# Patient Record
Sex: FEMALE | Race: WHITE | NOT HISPANIC OR LATINO | Employment: OTHER | URBAN - METROPOLITAN AREA
[De-identification: names, ages, dates, MRNs, and addresses within clinical notes are randomized per-mention and may not be internally consistent; named-entity substitution may affect disease eponyms.]

---

## 2019-04-15 ENCOUNTER — TELEPHONE (OUTPATIENT)
Dept: FAMILY MEDICINE | Facility: CLINIC | Age: 29
End: 2019-04-15

## 2019-04-15 NOTE — TELEPHONE ENCOUNTER
Called in patient medication called in and informed patient that she have to make appointment before her next refill. Patient schedule appointment for next Monday

## 2019-04-22 ENCOUNTER — OFFICE VISIT (OUTPATIENT)
Dept: FAMILY MEDICINE | Facility: CLINIC | Age: 29
End: 2019-04-22
Payer: MEDICAID

## 2019-04-22 VITALS
BODY MASS INDEX: 21.18 KG/M2 | SYSTOLIC BLOOD PRESSURE: 108 MMHG | TEMPERATURE: 98 F | WEIGHT: 143 LBS | HEART RATE: 90 BPM | HEIGHT: 69 IN | DIASTOLIC BLOOD PRESSURE: 80 MMHG | OXYGEN SATURATION: 99 % | RESPIRATION RATE: 18 BRPM

## 2019-04-22 DIAGNOSIS — Z79.899 ON LONG TERM DRUG THERAPY: ICD-10-CM

## 2019-04-22 DIAGNOSIS — E89.0 POSTOPERATIVE HYPOTHYROIDISM: ICD-10-CM

## 2019-04-22 DIAGNOSIS — F41.9 ANXIETY: ICD-10-CM

## 2019-04-22 DIAGNOSIS — R00.2 PALPITATION: ICD-10-CM

## 2019-04-22 DIAGNOSIS — Z78.9 ALCOHOL USE: ICD-10-CM

## 2019-04-22 DIAGNOSIS — C73 THYROID CANCER: ICD-10-CM

## 2019-04-22 PROBLEM — F10.90 ALCOHOL USE: Status: ACTIVE | Noted: 2019-04-22

## 2019-04-22 PROCEDURE — 99214 OFFICE O/P EST MOD 30 MIN: CPT | Mod: S$GLB,,, | Performed by: FAMILY MEDICINE

## 2019-04-22 PROCEDURE — 99214 PR OFFICE/OUTPT VISIT, EST, LEVL IV, 30-39 MIN: ICD-10-PCS | Mod: S$GLB,,, | Performed by: FAMILY MEDICINE

## 2019-04-22 RX ORDER — CITALOPRAM 20 MG/1
20 TABLET, FILM COATED ORAL DAILY
Qty: 30 TABLET | Refills: 11 | Status: SHIPPED | OUTPATIENT
Start: 2019-04-22 | End: 2019-06-18 | Stop reason: SDUPTHER

## 2019-04-22 RX ORDER — LEVOTHYROXINE SODIUM 88 UG/1
1 TABLET ORAL DAILY
COMMUNITY
End: 2019-04-24 | Stop reason: SDUPTHER

## 2019-04-22 RX ORDER — CITALOPRAM 20 MG/1
1 TABLET, FILM COATED ORAL DAILY
COMMUNITY
Start: 2019-04-15 | End: 2019-04-22 | Stop reason: SDUPTHER

## 2019-04-22 RX ORDER — LIOTHYRONINE SODIUM 5 UG/1
1 TABLET ORAL DAILY
COMMUNITY
End: 2019-04-24 | Stop reason: SDUPTHER

## 2019-04-22 NOTE — PROGRESS NOTES
Subjective:      Patient ID: Kimber Herndon is a 28 y.o. female.    Chief Complaint: Other (medication refill and blood work)      HPI:  28-year-old white female past medical history of focal papillary thyroid cancer and subclinical hypothyroidism and anxiety who presents with hypothyroidism.  Feels well.  Energy good.  Taking Celexa as directed.  No side effects to the meds noted. Has energy.  She is irritable today.  She attributes this to being on her cycle.  She is trying to get it tubal with her gyn    Past Medical History:   Diagnosis Date    Anxiety     Cancer     thyroid     Depression     Hypothyroidism      Past Surgical History:   Procedure Laterality Date    MOUTH SURGERY      TOTAL THYROIDECTOMY       Family History   Problem Relation Age of Onset    Arthritis Mother     Autoimmune disease Mother     Lupus Mother     Hypothyroidism Mother     Hypertension Father     Alcohol abuse Father     Hypothyroidism Father     Hypothyroidism Sister     Fibromyalgia Son     Asthma Son     Stroke Maternal Grandfather     Heart disease Maternal Grandfather     Cancer Paternal Grandmother      Social History     Socioeconomic History    Marital status:      Spouse name: Not on file    Number of children: Not on file    Years of education: Not on file    Highest education level: Not on file   Occupational History    Not on file   Social Needs    Financial resource strain: Not on file    Food insecurity:     Worry: Not on file     Inability: Not on file    Transportation needs:     Medical: Not on file     Non-medical: Not on file   Tobacco Use    Smoking status: Former Smoker     Last attempt to quit:      Years since quittin.3   Substance and Sexual Activity    Alcohol use: Not Currently     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    Drug use: Never    Sexual activity: Not on file   Lifestyle    Physical activity:     Days per week: Not on file     Minutes per session: Not  "on file    Stress: Not on file   Relationships    Social connections:     Talks on phone: Not on file     Gets together: Not on file     Attends Rastafari service: Not on file     Active member of club or organization: Not on file     Attends meetings of clubs or organizations: Not on file     Relationship status: Not on file   Other Topics Concern    Not on file   Social History Narrative    Not on file     Review of patient's allergies indicates:   Allergen Reactions    Codeine Anaphylaxis       Review of Systems   Constitutional: Negative for activity change, appetite change, chills, fatigue and fever.   HENT: Negative for congestion, ear pain, postnasal drip, rhinorrhea, sinus pressure, sinus pain and sore throat.    Eyes: Negative for pain and redness.   Respiratory: Negative for cough, chest tightness and shortness of breath.    Cardiovascular: Negative for chest pain and leg swelling.   Gastrointestinal: Negative for abdominal distention, abdominal pain, constipation, diarrhea, nausea and vomiting.   Endocrine: Negative for cold intolerance and heat intolerance.   Genitourinary: Negative for dysuria, frequency and hematuria.   Musculoskeletal: Negative for arthralgias, back pain and joint swelling.   Skin: Negative for pallor.   Neurological: Negative for dizziness and light-headedness.   Psychiatric/Behavioral: Positive for dysphoric mood. Negative for agitation, decreased concentration and hallucinations. The patient is not nervous/anxious.        Objective:       /80 (BP Location: Right arm, Patient Position: Sitting, BP Method: Medium (Manual))   Pulse 90   Temp 98.3 °F (36.8 °C) (Oral)   Resp 18   Ht 5' 9" (1.753 m)   Wt 64.9 kg (143 lb)   SpO2 99%   BMI 21.12 kg/m²   Physical Exam   Constitutional: She is oriented to person, place, and time. She appears well-developed and well-nourished.   HENT:   Head: Normocephalic and atraumatic.   Nose: Nose normal.   Eyes: Pupils are equal, round, " and reactive to light. Conjunctivae and EOM are normal.   Neck: Normal range of motion. Neck supple.   thryoidectomy scar noted   Cardiovascular: Normal rate, regular rhythm and normal heart sounds.   Pulmonary/Chest: Effort normal and breath sounds normal.   Abdominal: Soft.   Musculoskeletal: Normal range of motion.   Neurological: She is alert and oriented to person, place, and time.   Skin: Skin is warm and dry.   Psychiatric: She has a normal mood and affect. Her behavior is normal. Thought content normal.       Assessment:     1. Thyroid cancer    2. Postoperative hypothyroidism    3. On long term drug therapy    4. Alcohol use    5. Palpitation    6. Anxiety        Plan:   Thyroid cancer  -     Thyroglobulin; Future; Expected date: 04/22/2019  -     TSH and Free T4; Future; Expected date: 04/22/2019  -     T3, free; Future; Expected date: 04/22/2019  -     US Thyroid; Future; Expected date: 04/22/2019    Postoperative hypothyroidism  -     Thyroglobulin; Future; Expected date: 04/22/2019  -     TSH and Free T4; Future; Expected date: 04/22/2019  -     T3, free; Future; Expected date: 04/22/2019  -     US Thyroid; Future; Expected date: 04/22/2019    On long term drug therapy  -     CBC auto differential; Future; Expected date: 04/22/2019  -     Comprehensive metabolic panel; Future; Expected date: 04/22/2019    Alcohol use    Palpitation    Anxiety  -     citalopram (CELEXA) 20 MG tablet; Take 1 tablet (20 mg total) by mouth once daily.  Dispense: 30 tablet; Refill: 11      Order thyroglobulin.  Order ultrasound.  Refill thyroid meds based on labs.  Follow up in 6 months.    Medication List with Changes/Refills   Current Medications    LEVOTHYROXINE (SYNTHROID) 88 MCG TABLET    Take 1 tablet by mouth once daily.    LIOTHYRONINE (CYTOMEL) 5 MCG TAB    Take 1 tablet by mouth once daily.   Changed and/or Refilled Medications    Modified Medication Previous Medication    CITALOPRAM (CELEXA) 20 MG TABLET  citalopram (CELEXA) 20 MG tablet       Take 1 tablet (20 mg total) by mouth once daily.    Take 1 tablet by mouth once daily.

## 2019-04-23 DIAGNOSIS — E89.0 POSTOPERATIVE HYPOTHYROIDISM: ICD-10-CM

## 2019-04-23 DIAGNOSIS — C73 THYROID CANCER: Primary | ICD-10-CM

## 2019-04-24 ENCOUNTER — TELEPHONE (OUTPATIENT)
Dept: FAMILY MEDICINE | Facility: CLINIC | Age: 29
End: 2019-04-24

## 2019-04-24 DIAGNOSIS — E89.0 POSTOPERATIVE HYPOTHYROIDISM: ICD-10-CM

## 2019-04-24 DIAGNOSIS — E89.0 POSTOPERATIVE HYPOTHYROIDISM: Primary | ICD-10-CM

## 2019-04-24 LAB
ABS NRBC COUNT: 0 X 10 3/UL (ref 0–0.01)
ABSOLUTE BASOPHIL: 0.03 X 10 3/UL (ref 0–0.22)
ABSOLUTE EOSINOPHIL: 0.06 X 10 3/UL (ref 0.04–0.54)
ABSOLUTE IMMATURE GRAN: 0.01 X 10 3/UL (ref 0–0.04)
ABSOLUTE LYMPHOCYTE: 1.31 X 10 3/UL (ref 0.86–4.75)
ABSOLUTE MONOCYTE: 0.45 X 10 3/UL (ref 0.22–1.08)
ALBUMIN SERPL-MCNC: 4.9 G/DL (ref 3.5–5.2)
ALBUMIN/GLOB SERPL ELPH: 2.6 {RATIO} (ref 1–2.7)
ALP ISOS SERPL LEV INH-CCNC: 58 IU/L (ref 35–105)
ALT (SGPT): 12 U/L (ref 0–33)
ANION GAP SERPL CALC-SCNC: 11 MMOL/L (ref 8–17)
AST SERPL-CCNC: 16 U/L (ref 0–32)
BASOPHILS NFR BLD: 0.5 %
BILIRUBIN, TOTAL: 0.3 MG/DL (ref 0–1.2)
BUN/CREAT SERPL: 25.1 (ref 6–20)
CALCIUM SERPL-MCNC: 9.5 MG/DL (ref 8.6–10.2)
CARBON DIOXIDE, CO2: 30 MMOL/L (ref 22–29)
CHLORIDE: 103 MMOL/L (ref 98–107)
CREAT SERPL-MCNC: 0.68 MG/DL (ref 0.5–0.9)
EOSINOPHIL NFR BLD: 1 %
GFR ESTIMATION: 102.3
GLOBULIN: 1.9 G/DL (ref 1.5–4.5)
GLUCOSE: 87 MG/DL (ref 74–106)
HCT VFR BLD AUTO: 41.6 % (ref 37–47)
HGB BLD-MCNC: 13 G/DL (ref 12–16)
IMMATURE GRANULOCYTES: 0.2 % (ref 0–0.5)
LYMPHOCYTES NFR BLD: 21.6 %
MCH RBC QN AUTO: 29.7 PG (ref 27–32)
MCHC RBC AUTO-ENTMCNC: 31.3 G/DL (ref 32–36)
MCV RBC AUTO: 95.2 FL (ref 82–100)
MONOCYTES NFR BLD: 7.4 %
NEUTROPHILS ABSOLUTE COUNT: 4.21 X 10 3/UL (ref 2.15–7.56)
NEUTROPHILS NFR BLD: 69.3 %
NUCLEATED RED BLOOD CELLS: 0 /100 WBC (ref 0–0.2)
PLATELET # BLD AUTO: 208 X 10 3/UL (ref 135–400)
POTASSIUM: 5.3 MMOL/L (ref 3.5–5.1)
PROT SNV-MCNC: 6.8 G/DL (ref 6.4–8.3)
RBC # BLD AUTO: 4.37 X 10 6/UL (ref 4.2–5.4)
RDW-SD: 39.7 FL (ref 37–54)
SODIUM: 144 MMOL/L (ref 136–145)
T3FREE SERPL DIALY-MCNC: 2.63 PG/ML (ref 2–4.4)
T4, FREE: 1 NG/DL (ref 0.93–1.7)
THYROGLOB AB SERPL-ACNC: 1.4 IU/ML (ref 0–0.9)
TSH SERPL DL<=0.005 MIU/L-ACNC: 1.04 UIU/ML (ref 0.27–4.2)
UREA NITROGEN (BUN): 17.1 MG/DL (ref 6–20)
WBC # BLD: 6.07 X 10 3/UL (ref 4.3–10.8)

## 2019-04-24 RX ORDER — LEVOTHYROXINE SODIUM 88 UG/1
88 TABLET ORAL DAILY
Qty: 30 TABLET | Refills: 5 | Status: SHIPPED | OUTPATIENT
Start: 2019-04-24 | End: 2020-04-23

## 2019-04-24 RX ORDER — LEVOTHYROXINE SODIUM 88 UG/1
88 TABLET ORAL DAILY
Qty: 30 TABLET | Refills: 5 | Status: SHIPPED | OUTPATIENT
Start: 2019-04-24 | End: 2019-04-24

## 2019-04-24 RX ORDER — LIOTHYRONINE SODIUM 5 UG/1
5 TABLET ORAL DAILY
Qty: 30 TABLET | Refills: 5 | Status: SHIPPED | OUTPATIENT
Start: 2019-04-24 | End: 2019-11-30 | Stop reason: SDUPTHER

## 2019-05-22 ENCOUNTER — TELEPHONE (OUTPATIENT)
Dept: FAMILY MEDICINE | Facility: CLINIC | Age: 29
End: 2019-05-22

## 2019-05-22 DIAGNOSIS — C73 THYROID CANCER: Primary | ICD-10-CM

## 2019-05-30 ENCOUNTER — OFFICE VISIT (OUTPATIENT)
Dept: OBSTETRICS AND GYNECOLOGY | Facility: CLINIC | Age: 29
End: 2019-05-30
Payer: MEDICAID

## 2019-05-30 VITALS
HEART RATE: 61 BPM | BODY MASS INDEX: 21.92 KG/M2 | SYSTOLIC BLOOD PRESSURE: 106 MMHG | DIASTOLIC BLOOD PRESSURE: 65 MMHG | HEIGHT: 69 IN | WEIGHT: 148 LBS

## 2019-05-30 DIAGNOSIS — Z11.3 SCREENING FOR VENEREAL DISEASE: Primary | ICD-10-CM

## 2019-05-30 PROCEDURE — 99214 PR OFFICE/OUTPT VISIT, EST, LEVL IV, 30-39 MIN: ICD-10-PCS | Mod: S$GLB,,, | Performed by: OBSTETRICS & GYNECOLOGY

## 2019-05-30 PROCEDURE — 99214 OFFICE O/P EST MOD 30 MIN: CPT | Mod: S$GLB,,, | Performed by: OBSTETRICS & GYNECOLOGY

## 2019-05-30 NOTE — PROGRESS NOTES
"CC: STD check     Kimber Herndon is a 28 y.o. female  Presents for STD check due to cheating SO.     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  VULVAR: No pain, no lesions and no itching.  VAGINAL: No relaxation, no itching, no discharge, no abnormal bleeding and no lesions.  ABDOMEN: No abdominal pain. Denies nausea. Denies vomiting. No diarrhea. No constipation  BREAST: Denies pain. No lumps. No discharge.  URINARY: No incontinence, no nocturia, no frequency and no dysuria.  CARDIOVASCULAR: No chest pain. No shortness of breath. No leg cramps.  NEUROLOGICAL: No headaches. No vision changes.      /65   Pulse 61   Ht 5' 9" (1.753 m)   Wt 67.1 kg (148 lb)   BMI 21.86 kg/m²     PHYSICAL EXAM:  NAD  RRR  CTAB  SOFT NTND   Pelvic deferred   No cce     normal external genitalia, vulva, vagina, cervix, uterus and adnexa    ASSESSMENT and PLAN:    ICD-10-CM ICD-9-CM    1. Screening for venereal disease Z11.3 V74.5 CT/NG, T. vaginalis      HIV 1/2 Ag/Ab (4th Gen)      RPR      Hepatitis panel, acute      HIV " 1/2 Ag/Ab (4th Gen)      RPR      Hepatitis panel, acute      CT/NG, T. vaginalis       Safe sex precautions given   Gc/cz collected   STD panel orders sent     FOLLOW UP: PRN lack of improvement.

## 2019-06-02 LAB
C TRACH RRNA SPEC QL NAA+PROBE: NOT DETECTED
COMMENT: NORMAL
N GONORRHOEA RRNA SPEC QL NAA+PROBE: NOT DETECTED
T VAGINALIS RRNA SPEC QL NAA+PROBE: NOT DETECTED

## 2019-06-07 ENCOUNTER — OFFICE VISIT (OUTPATIENT)
Dept: OBSTETRICS AND GYNECOLOGY | Facility: CLINIC | Age: 29
End: 2019-06-07
Payer: MEDICAID

## 2019-06-07 VITALS
BODY MASS INDEX: 22.4 KG/M2 | HEART RATE: 85 BPM | HEIGHT: 68 IN | WEIGHT: 147.81 LBS | SYSTOLIC BLOOD PRESSURE: 106 MMHG | DIASTOLIC BLOOD PRESSURE: 69 MMHG

## 2019-06-07 DIAGNOSIS — Z30.09 STERILIZATION CONSULT: Primary | ICD-10-CM

## 2019-06-07 PROCEDURE — 99499 NO LOS: ICD-10-PCS | Mod: S$GLB,,, | Performed by: OBSTETRICS & GYNECOLOGY

## 2019-06-07 PROCEDURE — 99499 UNLISTED E&M SERVICE: CPT | Mod: S$GLB,,, | Performed by: OBSTETRICS & GYNECOLOGY

## 2019-06-07 NOTE — PROGRESS NOTES
28 y.o. No obstetric history on file. presents for pre-op H&P for BS for sterilization.     Past Medical History:   Diagnosis Date    Anxiety     Cancer     thyroid     Depression     Hypothyroidism      Past Surgical History:   Procedure Laterality Date    MOUTH SURGERY      TOTAL THYROIDECTOMY       Family History   Problem Relation Age of Onset    Arthritis Mother     Autoimmune disease Mother     Lupus Mother     Hypothyroidism Mother     Hypertension Father     Alcohol abuse Father     Hypothyroidism Father     Hypothyroidism Sister     Fibromyalgia Son     Asthma Son     Stroke Maternal Grandfather     Heart disease Maternal Grandfather     Cancer Paternal Grandmother      Review of patient's allergies indicates:   Allergen Reactions    Codeine Anaphylaxis       Current Outpatient Medications:     citalopram (CELEXA) 20 MG tablet, Take 1 tablet (20 mg total) by mouth once daily., Disp: 30 tablet, Rfl: 11    liothyronine (CYTOMEL) 5 MCG Tab, Take 1 tablet (5 mcg total) by mouth once daily., Disp: 30 tablet, Rfl: 5    SYNTHROID 88 mcg tablet, Take 1 tablet (88 mcg total) by mouth once daily., Disp: 30 tablet, Rfl: 5  Social History     Socioeconomic History    Marital status:      Spouse name: Not on file    Number of children: Not on file    Years of education: Not on file    Highest education level: Not on file   Occupational History    Not on file   Social Needs    Financial resource strain: Not on file    Food insecurity:     Worry: Not on file     Inability: Not on file    Transportation needs:     Medical: Not on file     Non-medical: Not on file   Tobacco Use    Smoking status: Former Smoker     Last attempt to quit:      Years since quittin.4   Substance and Sexual Activity    Alcohol use: Not Currently     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    Drug use: Never    Sexual activity: Not on file   Lifestyle    Physical activity:     Days per week: Not on file      Minutes per session: Not on file    Stress: Not on file   Relationships    Social connections:     Talks on phone: Not on file     Gets together: Not on file     Attends Yazdanism service: Not on file     Active member of club or organization: Not on file     Attends meetings of clubs or organizations: Not on file     Relationship status: Not on file   Other Topics Concern    Not on file   Social History Narrative    Not on file       Review of Systems    Vitals:    06/07/19 1111   BP: 106/69   Pulse: 85       Physical Exam    Last pap neg   Last pathology n/a   Last ultrasound n/a     Assessment: undesired fertility     Plan: to OR for lap BS   I have discussed the risks, benefits, indications, and alternatives of the procedure in detail.  The patient verbalizes her understanding.  All questions answered.  Consents signed.  The patient agrees to proceed to proceed as planned.

## 2019-06-10 LAB
ABS NRBC COUNT: 0 X 10 3/UL (ref 0–0.01)
ABSOLUTE BASOPHIL: 0.01 X 10 3/UL (ref 0–0.22)
ABSOLUTE EOSINOPHIL: 0.04 X 10 3/UL (ref 0.04–0.54)
ABSOLUTE IMMATURE GRAN: 0.01 X 10 3/UL (ref 0–0.04)
ABSOLUTE LYMPHOCYTE: 1.23 X 10 3/UL (ref 0.86–4.75)
ABSOLUTE MONOCYTE: 0.31 X 10 3/UL (ref 0.22–1.08)
ANION GAP SERPL CALC-SCNC: 7 MMOL/L (ref 8–17)
BASOPHILS NFR BLD: 0.3 %
BLOOD GROUPING: NORMAL
BLOOD TYPE (D): POSITIVE
BUN/CREAT SERPL: 29.8 (ref 6–20)
CALCIUM SERPL-MCNC: 9.5 MG/DL (ref 8.6–10.2)
CARBON DIOXIDE, CO2: 30 MMOL/L (ref 22–29)
CHLORIDE: 105 MMOL/L (ref 98–107)
CREAT SERPL-MCNC: 0.59 MG/DL (ref 0.5–0.9)
EOSINOPHIL NFR BLD: 1.1 %
GFR ESTIMATION: 120.51
GLUCOSE: 85 MG/DL (ref 74–106)
HCT VFR BLD AUTO: 39.1 % (ref 37–47)
HGB BLD-MCNC: 12.6 G/DL (ref 12–16)
IMMATURE GRANULOCYTES: 0.3 % (ref 0–0.5)
LYMPHOCYTES NFR BLD: 35 %
MCH RBC QN AUTO: 30.7 PG (ref 27–32)
MCHC RBC AUTO-ENTMCNC: 32.2 G/DL (ref 32–36)
MCV RBC AUTO: 95.1 FL (ref 82–100)
MONOCYTES NFR BLD: 8.8 %
NEUTROPHILS ABSOLUTE COUNT: 1.91 X 10 3/UL (ref 2.15–7.56)
NEUTROPHILS NFR BLD: 54.5 %
NUCLEATED RED BLOOD CELLS: 0 /100 WBC (ref 0–0.2)
PLATELET # BLD AUTO: 174 X 10 3/UL (ref 135–400)
POTASSIUM: 5.7 MMOL/L (ref 3.5–5.1)
RBC # BLD AUTO: 4.11 X 10 6/UL (ref 4.2–5.4)
RDW-SD: 39.5 FL (ref 37–54)
SODIUM: 142 MMOL/L (ref 136–145)
UREA NITROGEN (BUN): 17.6 MG/DL (ref 6–20)
WBC # BLD: 3.51 X 10 3/UL (ref 4.3–10.8)

## 2019-06-12 LAB
ANION GAP SERPL CALC-SCNC: 11 MMOL/L (ref 8–17)
BUN/CREAT SERPL: 22.1 (ref 6–20)
CALCIUM SERPL-MCNC: 9.1 MG/DL (ref 8.6–10.2)
CARBON DIOXIDE, CO2: 29 MMOL/L (ref 22–29)
CHLORIDE: 101 MMOL/L (ref 98–107)
CREAT SERPL-MCNC: 0.62 MG/DL (ref 0.5–0.9)
GFR ESTIMATION: 113.8
GLUCOSE: 114 MG/DL (ref 74–106)
POTASSIUM: 3.9 MMOL/L (ref 3.5–5.1)
SODIUM: 141 MMOL/L (ref 136–145)
UREA NITROGEN (BUN): 13.7 MG/DL (ref 6–20)

## 2019-06-18 DIAGNOSIS — F41.9 ANXIETY: ICD-10-CM

## 2019-06-18 RX ORDER — CITALOPRAM 20 MG/1
30 TABLET, FILM COATED ORAL DAILY
Qty: 45 TABLET | Refills: 11 | Status: SHIPPED | OUTPATIENT
Start: 2019-06-18 | End: 2020-02-28 | Stop reason: SDUPTHER

## 2019-06-19 ENCOUNTER — TELEPHONE (OUTPATIENT)
Dept: FAMILY MEDICINE | Facility: CLINIC | Age: 29
End: 2019-06-19

## 2019-06-19 NOTE — TELEPHONE ENCOUNTER
----- Message from Mayela Treadwell sent at 6/19/2019 10:29 AM CDT -----  Needs her liothyronine (CYTOMEL) 5 MCG Tab and SYNTHROID 88 mcg tablet sent to [facility]///she is out of the cytomel

## 2019-06-19 NOTE — TELEPHONE ENCOUNTER
Spoke with patient and informed that medication is being sent to her pharmacy stated understanding

## 2019-07-01 ENCOUNTER — OFFICE VISIT (OUTPATIENT)
Dept: OBSTETRICS AND GYNECOLOGY | Facility: CLINIC | Age: 29
End: 2019-07-01
Payer: MEDICAID

## 2019-07-01 VITALS
HEART RATE: 67 BPM | DIASTOLIC BLOOD PRESSURE: 76 MMHG | HEIGHT: 68 IN | SYSTOLIC BLOOD PRESSURE: 111 MMHG | BODY MASS INDEX: 21.98 KG/M2 | WEIGHT: 145 LBS

## 2019-07-01 DIAGNOSIS — Z30.09 STERILIZATION CONSULT: Primary | ICD-10-CM

## 2019-07-01 LAB
ABS NRBC COUNT: 0 X 10 3/UL (ref 0–0.01)
ABSOLUTE BASOPHIL: 0.01 X 10 3/UL (ref 0–0.22)
ABSOLUTE EOSINOPHIL: 0.04 X 10 3/UL (ref 0.04–0.54)
ABSOLUTE IMMATURE GRAN: 0.01 X 10 3/UL (ref 0–0.04)
ABSOLUTE LYMPHOCYTE: 0.9 X 10 3/UL (ref 0.86–4.75)
ABSOLUTE MONOCYTE: 0.32 X 10 3/UL (ref 0.22–1.08)
ANION GAP SERPL CALC-SCNC: 7 MMOL/L (ref 8–17)
B-HCG UR QL: NEGATIVE
BASOPHILS NFR BLD: 0.3 %
BLOOD GROUPING: NORMAL
BLOOD TYPE (D): POSITIVE
BUN/CREAT SERPL: 24 (ref 6–20)
CALCIUM SERPL-MCNC: 8.8 MG/DL (ref 8.6–10.2)
CARBON DIOXIDE, CO2: 29 MMOL/L (ref 22–29)
CHLORIDE: 106 MMOL/L (ref 98–107)
CREAT SERPL-MCNC: 0.58 MG/DL (ref 0.5–0.9)
EOSINOPHIL NFR BLD: 1.1 %
GFR ESTIMATION: 122.91
GLUCOSE: 80 MG/DL (ref 74–106)
HCT VFR BLD AUTO: 36.6 % (ref 37–47)
HGB BLD-MCNC: 11.6 G/DL (ref 12–16)
IMMATURE GRANULOCYTES: 0.3 % (ref 0–0.5)
LYMPHOCYTES NFR BLD: 25 %
MCH RBC QN AUTO: 30.3 PG (ref 27–32)
MCHC RBC AUTO-ENTMCNC: 31.7 G/DL (ref 32–36)
MCV RBC AUTO: 95.6 FL (ref 82–100)
MONOCYTES NFR BLD: 8.9 %
NEUTROPHILS ABSOLUTE COUNT: 2.32 X 10 3/UL (ref 2.15–7.56)
NEUTROPHILS NFR BLD: 64.4 %
NUCLEATED RED BLOOD CELLS: 0 /100 WBC (ref 0–0.2)
PLATELET # BLD AUTO: 151 X 10 3/UL (ref 135–400)
POTASSIUM: 3.7 MMOL/L (ref 3.5–5.1)
RBC # BLD AUTO: 3.83 X 10 6/UL (ref 4.2–5.4)
RDW-SD: 39.5 FL (ref 37–54)
SODIUM: 142 MMOL/L (ref 136–145)
UREA NITROGEN (BUN): 13.9 MG/DL (ref 6–20)
WBC # BLD: 3.6 X 10 3/UL (ref 4.3–10.8)

## 2019-07-01 PROCEDURE — 99499 NO LOS: ICD-10-PCS | Mod: S$GLB,,, | Performed by: OBSTETRICS & GYNECOLOGY

## 2019-07-01 PROCEDURE — 99499 UNLISTED E&M SERVICE: CPT | Mod: S$GLB,,, | Performed by: OBSTETRICS & GYNECOLOGY

## 2019-07-01 NOTE — PROGRESS NOTES
28 y.o. No obstetric history on file. presents for pre-op H&P for BS for sterilization.     Past Medical History:   Diagnosis Date    Anxiety     Cancer     thyroid     Depression     Hypothyroidism      Past Surgical History:   Procedure Laterality Date    MOUTH SURGERY      TOTAL THYROIDECTOMY       Family History   Problem Relation Age of Onset    Arthritis Mother     Autoimmune disease Mother     Lupus Mother     Hypothyroidism Mother     Hypertension Father     Alcohol abuse Father     Hypothyroidism Father     Hypothyroidism Sister     Fibromyalgia Son     Asthma Son     Stroke Maternal Grandfather     Heart disease Maternal Grandfather     Cancer Paternal Grandmother      Review of patient's allergies indicates:   Allergen Reactions    Codeine Anaphylaxis       Current Outpatient Medications:     citalopram (CELEXA) 20 MG tablet, Take 1.5 tablets (30 mg total) by mouth once daily., Disp: 45 tablet, Rfl: 11    liothyronine (CYTOMEL) 5 MCG Tab, Take 1 tablet (5 mcg total) by mouth once daily., Disp: 30 tablet, Rfl: 5    SYNTHROID 88 mcg tablet, Take 1 tablet (88 mcg total) by mouth once daily., Disp: 30 tablet, Rfl: 5  Social History     Socioeconomic History    Marital status:      Spouse name: Not on file    Number of children: Not on file    Years of education: Not on file    Highest education level: Not on file   Occupational History    Not on file   Social Needs    Financial resource strain: Not on file    Food insecurity:     Worry: Not on file     Inability: Not on file    Transportation needs:     Medical: Not on file     Non-medical: Not on file   Tobacco Use    Smoking status: Former Smoker     Last attempt to quit:      Years since quittin.4   Substance and Sexual Activity    Alcohol use: Not Currently     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    Drug use: Never    Sexual activity: Not on file   Lifestyle    Physical activity:     Days per week: Not on file      Minutes per session: Not on file    Stress: Not on file   Relationships    Social connections:     Talks on phone: Not on file     Gets together: Not on file     Attends Voodoo service: Not on file     Active member of club or organization: Not on file     Attends meetings of clubs or organizations: Not on file     Relationship status: Not on file   Other Topics Concern    Not on file   Social History Narrative    Not on file       Review of Systems   Constitutional: Negative for chills and fever.   Respiratory: Negative for shortness of breath.    Cardiovascular: Negative for chest pain.   Gastrointestinal: Negative for abdominal pain, blood in stool, constipation, diarrhea, nausea, vomiting and reflux.   Genitourinary: Negative for dysmenorrhea, dyspareunia, dysuria, hematuria, hot flashes, menorrhagia, menstrual problem, pelvic pain, vaginal bleeding, vaginal discharge, postcoital bleeding and vaginal dryness.   Musculoskeletal: Negative for arthralgias and joint swelling.   Integumentary:  Negative for rash, hair changes, breast mass, nipple discharge and breast skin changes.   Psychiatric/Behavioral: Negative for depression. The patient is not nervous/anxious.    Breast: Negative for asymmetry, lump, mass, nipple discharge and skin changes      Vitals:    07/01/19 1309   BP: 111/76   Pulse: 67       Physical Exam:   Constitutional: She appears well-developed and well-nourished. No distress.    HENT:   Head: Normocephalic.    Eyes: Conjunctivae and EOM are normal.    Neck: Normal range of motion. No tracheal deviation present. No thyromegaly present.    Cardiovascular: Exam reveals no clubbing, no cyanosis and no edema.     Pulmonary/Chest: Effort normal. No respiratory distress.                  Musculoskeletal: Normal range of motion and moves all extremeties.        Skin: No rash noted. She is not diaphoretic. No cyanosis. Nails show no clubbing.    Psychiatric: She has a normal mood and affect. Her  behavior is normal. Judgment and thought content normal.       Last pap neg   Last pathology n/a   Last ultrasound n/a     Assessment: undesired fertility     Plan:   K now WNL - ok for surgery   to OR for lap BS   I have discussed the risks, benefits, indications, and alternatives of the procedure in detail.  The patient verbalizes her understanding.  All questions answered.  Consents signed.  The patient agrees to proceed to proceed as planned.

## 2019-07-02 ENCOUNTER — OUTSIDE PLACE OF SERVICE (OUTPATIENT)
Dept: OBSTETRICS AND GYNECOLOGY | Facility: CLINIC | Age: 29
End: 2019-07-02
Payer: MEDICAID

## 2019-07-02 PROCEDURE — 58661 PR LAP,RMV  ADNEXAL STRUCTURE: ICD-10-PCS | Mod: 50,,, | Performed by: OBSTETRICS & GYNECOLOGY

## 2019-07-02 PROCEDURE — 58661 LAPAROSCOPY REMOVE ADNEXA: CPT | Mod: 50,,, | Performed by: OBSTETRICS & GYNECOLOGY

## 2019-07-16 ENCOUNTER — OFFICE VISIT (OUTPATIENT)
Dept: OBSTETRICS AND GYNECOLOGY | Facility: CLINIC | Age: 29
End: 2019-07-16
Payer: MEDICAID

## 2019-07-16 VITALS
HEIGHT: 68 IN | WEIGHT: 145 LBS | BODY MASS INDEX: 21.98 KG/M2 | SYSTOLIC BLOOD PRESSURE: 100 MMHG | HEART RATE: 73 BPM | DIASTOLIC BLOOD PRESSURE: 64 MMHG

## 2019-07-16 DIAGNOSIS — Z98.51 S/P TUBAL LIGATION: Primary | ICD-10-CM

## 2019-07-16 PROCEDURE — 99024 PR POST-OP FOLLOW-UP VISIT: ICD-10-PCS | Mod: S$GLB,,, | Performed by: OBSTETRICS & GYNECOLOGY

## 2019-07-16 PROCEDURE — 99024 POSTOP FOLLOW-UP VISIT: CPT | Mod: S$GLB,,, | Performed by: OBSTETRICS & GYNECOLOGY

## 2019-07-16 NOTE — PROGRESS NOTES
Subjective:       Patient ID: Kimber Herndon 28 y.o. WF     Chief Complaint: postop       History of Present Illness  Pt here for wound check.  Pt doing well with normal postop complaints.    Review of Systems   Constitutional: Negative for activity change, appetite change, chills, diaphoresis and fever.   Eyes: Negative for visual disturbance.   Respiratory: Negative for shortness of breath and wheezing.    Cardiovascular: Negative for chest pain.   Gastrointestinal: Negative for blood in stool, constipation, diarrhea, nausea and vomiting.   Genitourinary: Negative for dysuria, hematuria and menorrhagia.   Neurological: Negative for headaches.   Psychiatric/Behavioral: Negative for depression. The patient is not nervous/anxious.    Breast: Negative for lump, skin changes and tenderness          Objective:    Physical Exam:   Constitutional: She appears well-developed and well-nourished. No distress.    HENT:   Head: Normocephalic and atraumatic.      Cardiovascular: Exam reveals no clubbing and no cyanosis.     Pulmonary/Chest: Effort normal. No respiratory distress.        Abdominal: Soft. She exhibits abdominal incisions (clean dry and intact). She exhibits no distension. There is no rebound and no guarding.             Musculoskeletal: Normal range of motion and moves all extremeties.        Skin: Skin is warm and dry. She is not diaphoretic. No cyanosis or erythema. Nails show no clubbing.    Psychiatric: She has a normal mood and affect. Her behavior is normal.          Assessment:     SP b/l salpingectomy   Wound check normal          Plan:      Keep wound clean and dry  Pain bleeding fever precautions given  rtc for 6 wk exam

## 2019-08-06 ENCOUNTER — TELEPHONE (OUTPATIENT)
Dept: FAMILY MEDICINE | Facility: CLINIC | Age: 29
End: 2019-08-06

## 2019-08-06 NOTE — TELEPHONE ENCOUNTER
----- Message from Ge Valladares MD sent at 8/6/2019  1:53 PM CDT -----  Schedule her an appointment    ----- Message -----  From: Evelyn Tatum LPN  Sent: 8/6/2019   1:23 PM  To: Ge Valladares MD    States that she wants her kidneys check cause she have been having high potassium level result.  ----- Message -----  From: Mayela Treadwell  Sent: 8/5/2019   3:50 PM  To: [facility] Staff    Wants a referral to Dr Andreas Graff ([phone]) so he can check out her kidneys//said she called and they do take her insurance

## 2019-11-26 ENCOUNTER — OFFICE VISIT (OUTPATIENT)
Dept: FAMILY MEDICINE | Facility: CLINIC | Age: 29
End: 2019-11-26
Payer: MEDICAID

## 2019-11-26 VITALS
OXYGEN SATURATION: 98 % | RESPIRATION RATE: 18 BRPM | HEART RATE: 66 BPM | DIASTOLIC BLOOD PRESSURE: 66 MMHG | TEMPERATURE: 98 F | HEIGHT: 68 IN | WEIGHT: 151 LBS | SYSTOLIC BLOOD PRESSURE: 112 MMHG | BODY MASS INDEX: 22.88 KG/M2

## 2019-11-26 DIAGNOSIS — K58.1 IRRITABLE BOWEL SYNDROME WITH CONSTIPATION: ICD-10-CM

## 2019-11-26 DIAGNOSIS — N83.201 CYST OF RIGHT OVARY: ICD-10-CM

## 2019-11-26 DIAGNOSIS — M41.9 SCOLIOSIS, UNSPECIFIED SCOLIOSIS TYPE, UNSPECIFIED SPINAL REGION: ICD-10-CM

## 2019-11-26 DIAGNOSIS — R10.9 ABDOMINAL PAIN, UNSPECIFIED ABDOMINAL LOCATION: ICD-10-CM

## 2019-11-26 DIAGNOSIS — Z79.899 ON LONG TERM DRUG THERAPY: ICD-10-CM

## 2019-11-26 DIAGNOSIS — E89.0 POSTOPERATIVE HYPOTHYROIDISM: ICD-10-CM

## 2019-11-26 DIAGNOSIS — R10.9 FLANK PAIN: Primary | ICD-10-CM

## 2019-11-26 PROCEDURE — 81003 URINALYSIS AUTO W/O SCOPE: CPT | Mod: QW,S$GLB,, | Performed by: FAMILY MEDICINE

## 2019-11-26 PROCEDURE — 99214 PR OFFICE/OUTPT VISIT, EST, LEVL IV, 30-39 MIN: ICD-10-PCS | Mod: 25,S$GLB,, | Performed by: FAMILY MEDICINE

## 2019-11-26 PROCEDURE — 81003 POCT URINALYSIS: ICD-10-PCS | Mod: QW,S$GLB,, | Performed by: FAMILY MEDICINE

## 2019-11-26 PROCEDURE — 99214 OFFICE O/P EST MOD 30 MIN: CPT | Mod: 25,S$GLB,, | Performed by: FAMILY MEDICINE

## 2019-11-26 RX ORDER — KETOROLAC TROMETHAMINE 10 MG/1
TABLET, FILM COATED ORAL
COMMUNITY
Start: 2019-10-25 | End: 2020-09-29

## 2019-11-26 NOTE — PROGRESS NOTES
Subjective:      Patient ID: Kimber Herndon is a 29 y.o. female.    Chief Complaint: Follow-up (chronic constipation despite stool softener/lower right flank pain/has been to ER and has no kidney stones/dx with ovarian cysts/recently saw blood in her stool/would like colonoscopy) and Back Pain (would like a referral to discuss her scoliosis/sees chiropractor that recommended it)      HPI:  29-year-old white female past history of thyroid cancer, anxiety and postsurgical hypothyroidism who presents with multitude of complaints.  She complains of a long history of constipation.  Has had around 1 bowel movement a week for most of her life.  Has tried MiraLax.  Used to work but has no longer worked as well.  Noticed some blood in her stool.  No weight loss.  Reports drinking lots of water.  She eats a healthy raw diet.  Has intermittent abdominal pain. Complains of painful intercourse.  Reports partners well endowed.  Does have some bleeding after sex sometimes.  Complains of low back pain. She does have scoliosis.  Reports she was offered surgery as a child but her parents declined.  Reports sometimes is worse with standing.  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 Had some hematuria month or 2 ago.  She went to the emergency room.  Had multiple tests.  Noncontributory.  She is worried it may be her kidneys as she had low potassium in the past.    Past Medical History:   Diagnosis Date    Anxiety     Cancer     thyroid     Depression     Hypothyroidism      Past Surgical History:   Procedure Laterality Date    MOUTH SURGERY      TOTAL THYROIDECTOMY      TUBAL LIGATION      bilateral tubes removed      Family History   Problem Relation Age of Onset    Arthritis Mother     Autoimmune disease Mother     Lupus Mother     Hypothyroidism Mother     Hypertension Father     Alcohol abuse Father     Hypothyroidism Father     Hypothyroidism Sister     Fibromyalgia Son      Asthma Son     Stroke Maternal Grandfather     Heart disease Maternal Grandfather     Cancer Paternal Grandmother      Social History     Socioeconomic History    Marital status:      Spouse name: Not on file    Number of children: Not on file    Years of education: Not on file    Highest education level: Not on file   Occupational History    Not on file   Social Needs    Financial resource strain: Not on file    Food insecurity:     Worry: Not on file     Inability: Not on file    Transportation needs:     Medical: Not on file     Non-medical: Not on file   Tobacco Use    Smoking status: Former Smoker     Last attempt to quit:      Years since quittin.9   Substance and Sexual Activity    Alcohol use: Not Currently     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    Drug use: Never    Sexual activity: Not on file   Lifestyle    Physical activity:     Days per week: Not on file     Minutes per session: Not on file    Stress: Not on file   Relationships    Social connections:     Talks on phone: Not on file     Gets together: Not on file     Attends Synagogue service: Not on file     Active member of club or organization: Not on file     Attends meetings of clubs or organizations: Not on file     Relationship status: Not on file   Other Topics Concern    Not on file   Social History Narrative    Not on file     Review of patient's allergies indicates:   Allergen Reactions    Codeine Anaphylaxis       Review of Systems   Constitutional: Negative for activity change, appetite change, chills, fatigue and fever.   HENT: Negative for congestion, ear pain, postnasal drip, rhinorrhea, sinus pressure, sinus pain and sore throat.    Eyes: Negative for pain and redness.   Respiratory: Negative for cough, chest tightness and shortness of breath.    Cardiovascular: Negative for chest pain and leg swelling.   Gastrointestinal: Positive for anal bleeding and constipation. Negative for abdominal distention,  "abdominal pain, diarrhea, nausea and vomiting.   Endocrine: Negative for cold intolerance and heat intolerance.   Genitourinary: Positive for vaginal bleeding and vaginal pain. Negative for dysuria, frequency and hematuria.   Musculoskeletal: Positive for back pain. Negative for arthralgias and joint swelling.   Skin: Negative for pallor.   Neurological: Negative for dizziness and light-headedness.   Psychiatric/Behavioral: Negative for agitation, decreased concentration and hallucinations. The patient is not nervous/anxious.        Objective:       /66 (BP Location: Left arm, Patient Position: Sitting, BP Method: Medium (Manual))   Pulse 66   Temp 98.2 °F (36.8 °C) (Oral)   Resp 18   Ht 5' 8" (1.727 m)   Wt 68.5 kg (151 lb)   SpO2 98%   BMI 22.96 kg/m²   Physical Exam   Constitutional: She is oriented to person, place, and time. She appears well-developed and well-nourished.   HENT:   Head: Normocephalic and atraumatic.   Nose: Nose normal.   Eyes: Pupils are equal, round, and reactive to light. Conjunctivae and EOM are normal.   Neck: Normal range of motion. Neck supple.   Cardiovascular: Normal rate, regular rhythm and normal heart sounds.   Pulmonary/Chest: Effort normal and breath sounds normal.   Abdominal: Soft.   Musculoskeletal: Normal range of motion.   Scoliosis evident.   Neurological: She is alert and oriented to person, place, and time.   Skin: Skin is warm and dry.   Psychiatric: She has a normal mood and affect. Her behavior is normal. Thought content normal.       Assessment:     1. Flank pain    2. Scoliosis, unspecified scoliosis type, unspecified spinal region    3. Postoperative hypothyroidism    4. On long term drug therapy    5. Abdominal pain, unspecified abdominal location        Plan:   Flank pain  -     POCT Urinalysis    Scoliosis, unspecified scoliosis type, unspecified spinal region  -     X-Ray Spine Scoliosis 4 or 5 views; Future; Expected date: " 11/26/2019    Postoperative hypothyroidism  -     TSH; Future; Expected date: 11/26/2019  -     T4, free; Future; Expected date: 11/26/2019  -     T3, free; Future; Expected date: 11/26/2019    On long term drug therapy  -     CBC auto differential; Future; Expected date: 11/26/2019  -     Comprehensive metabolic panel; Future; Expected date: 11/26/2019    Abdominal pain, unspecified abdominal location  -     POCT urine pregnancy; Future; Expected date: 11/26/2019      Labs pending.  Adjust thyroid medication based on lab findings.    Sent to the hospital for scoliosis films.  At this point time I do not think surgical intervention would be warranted.  Discussed this with patient.  Will discuss further once X ray room obtain.    Sample Linzess 72 mics provided.    Discussed painful intercourse with gyn.    Symptoms likely related to IBS.    Follow-up in 1 month.  Sooner if needed.    Will need repeat ultrasound in 3-6 months.  ER note reviewed.    Medication List with Changes/Refills   Current Medications    CITALOPRAM (CELEXA) 20 MG TABLET    Take 1.5 tablets (30 mg total) by mouth once daily.    KETOROLAC (TORADOL) 10 MG TABLET        LIOTHYRONINE (CYTOMEL) 5 MCG TAB    Take 1 tablet (5 mcg total) by mouth once daily.    SYNTHROID 88 MCG TABLET    Take 1 tablet (88 mcg total) by mouth once daily.

## 2019-11-27 DIAGNOSIS — M41.9 SCOLIOSIS, UNSPECIFIED SCOLIOSIS TYPE, UNSPECIFIED SPINAL REGION: Primary | ICD-10-CM

## 2019-11-27 DIAGNOSIS — D72.819 LEUKOPENIA, UNSPECIFIED TYPE: Primary | ICD-10-CM

## 2019-11-27 DIAGNOSIS — E89.0 POSTOPERATIVE HYPOTHYROIDISM: ICD-10-CM

## 2019-11-27 LAB
ABS NRBC COUNT: 0 X 10 3/UL (ref 0–0.01)
ABSOLUTE BASOPHIL: 0.01 X 10 3/UL (ref 0–0.22)
ABSOLUTE EOSINOPHIL: 0.08 X 10 3/UL (ref 0.04–0.54)
ABSOLUTE IMMATURE GRAN: 0.01 X 10 3/UL (ref 0–0.04)
ABSOLUTE LYMPHOCYTE: 1.09 X 10 3/UL (ref 0.86–4.75)
ABSOLUTE MONOCYTE: 0.25 X 10 3/UL (ref 0.22–1.08)
ALBUMIN SERPL-MCNC: 4.7 G/DL (ref 3.5–5.2)
ALBUMIN/GLOB SERPL ELPH: 2.5 {RATIO} (ref 1–2.7)
ALP ISOS SERPL LEV INH-CCNC: 69 U/L (ref 35–105)
ALT (SGPT): 13 U/L (ref 0–33)
ANION GAP SERPL CALC-SCNC: 9 MMOL/L (ref 8–17)
AST SERPL-CCNC: 17 U/L (ref 0–32)
BASOPHILS NFR BLD: 0.4 % (ref 0.2–1.2)
BILIRUB SERPL-MCNC: NORMAL MG/DL
BILIRUBIN, TOTAL: 0.23 MG/DL (ref 0–1.2)
BLOOD, POC UA: NORMAL
BUN/CREAT SERPL: 20.2 (ref 6–20)
CALCIUM SERPL-MCNC: 8.9 MG/DL (ref 8.6–10.2)
CARBON DIOXIDE, CO2: 31 MMOL/L (ref 22–29)
CHLORIDE: 102 MMOL/L (ref 98–107)
CREAT SERPL-MCNC: 0.58 MG/DL (ref 0.5–0.9)
EOSINOPHIL NFR BLD: 2.9 % (ref 0.7–7)
GFR ESTIMATION: 122.06
GLOBULIN: 1.9 G/DL (ref 1.5–4.5)
GLUCOSE UR QL STRIP: NORMAL
GLUCOSE: 78 MG/DL (ref 74–106)
HCT VFR BLD AUTO: 37.9 % (ref 37–47)
HGB BLD-MCNC: 12.4 G/DL (ref 12–16)
IMMATURE GRANULOCYTES: 0.4 % (ref 0–0.5)
KETONES UR QL STRIP: NORMAL
LEUKOCYTE ESTERASE URINE, POC: NORMAL
LYMPHOCYTES NFR BLD: 39.9 % (ref 19.3–53.1)
MCH RBC QN AUTO: 30.7 PG (ref 27–32)
MCHC RBC AUTO-ENTMCNC: 32.7 G/DL (ref 32–36)
MCV RBC AUTO: 93.8 FL (ref 82–100)
MONOCYTES NFR BLD: 9.2 % (ref 4.7–12.5)
NEUTROPHILS ABSOLUTE COUNT: 1.29 X 10 3/UL (ref 2.15–7.56)
NEUTROPHILS NFR BLD: 47.2 %
NITRITE, POC UA: NORMAL
NUCLEATED RED BLOOD CELLS: 0 /100 WBC (ref 0–0.2)
PH, POC UA: 5
PLATELET # BLD AUTO: 171 X 10 3/UL (ref 135–400)
POTASSIUM: 4.2 MMOL/L (ref 3.5–5.1)
PROT SNV-MCNC: 6.6 G/DL (ref 6.4–8.3)
PROTEIN, POC: 15
RBC # BLD AUTO: 4.04 X 10 6/UL (ref 4.2–5.4)
RDW-SD: 38.1 FL (ref 37–54)
SODIUM: 142 MMOL/L (ref 136–145)
SPECIFIC GRAVITY, POC UA: 1
T3FREE SERPL DIALY-MCNC: 2.05 PG/ML (ref 2–4.4)
T4, FREE: 1.06 NG/DL (ref 0.93–1.7)
TSH SERPL DL<=0.005 MIU/L-ACNC: 7.63 UIU/ML (ref 0.27–4.2)
UREA NITROGEN (BUN): 11.7 MG/DL (ref 6–20)
UROBILINOGEN, POC UA: 0.2
WBC # BLD: 2.73 X 10 3/UL (ref 4.3–10.8)

## 2019-11-27 RX ORDER — LEVOTHYROXINE SODIUM 100 UG/1
100 TABLET ORAL DAILY
Qty: 30 TABLET | Refills: 3 | Status: SHIPPED | OUTPATIENT
Start: 2019-11-27 | End: 2020-07-01 | Stop reason: SDUPTHER

## 2019-11-30 DIAGNOSIS — E89.0 POSTOPERATIVE HYPOTHYROIDISM: ICD-10-CM

## 2019-12-02 RX ORDER — LIOTHYRONINE SODIUM 5 UG/1
TABLET ORAL
Qty: 30 TABLET | Refills: 3 | Status: SHIPPED | OUTPATIENT
Start: 2019-12-02 | End: 2020-07-01 | Stop reason: SDUPTHER

## 2019-12-09 ENCOUNTER — TELEPHONE (OUTPATIENT)
Dept: FAMILY MEDICINE | Facility: CLINIC | Age: 29
End: 2019-12-09

## 2019-12-09 DIAGNOSIS — K59.04 CHRONIC IDIOPATHIC CONSTIPATION: Primary | ICD-10-CM

## 2019-12-09 NOTE — TELEPHONE ENCOUNTER
Called patient, informed that a script for linzess was sent to her pharmacy. Stated understanding and thank you.

## 2019-12-09 NOTE — TELEPHONE ENCOUNTER
----- Message from Ge Valladares MD sent at 12/9/2019  3:25 PM CST -----  Contact: Bee  sent  ----- Message -----  From: Evelyn Tatum LPN  Sent: 12/9/2019   2:43 PM CST  To: Ge Valladares MD    Script sent to [facility]  ----- Message -----  From: Diana Jovel  Sent: 12/9/2019   1:44 PM CST  To: Blaine Monsalve Ge Staff    Pt received samples of Linzess 72 & wants a script for it.

## 2020-01-03 ENCOUNTER — OFFICE VISIT (OUTPATIENT)
Dept: FAMILY MEDICINE | Facility: CLINIC | Age: 30
End: 2020-01-03
Payer: MEDICAID

## 2020-01-03 VITALS
BODY MASS INDEX: 23.19 KG/M2 | DIASTOLIC BLOOD PRESSURE: 70 MMHG | SYSTOLIC BLOOD PRESSURE: 114 MMHG | RESPIRATION RATE: 18 BRPM | TEMPERATURE: 99 F | HEIGHT: 68 IN | HEART RATE: 79 BPM | WEIGHT: 153 LBS | OXYGEN SATURATION: 98 %

## 2020-01-03 DIAGNOSIS — K59.04 CHRONIC IDIOPATHIC CONSTIPATION: ICD-10-CM

## 2020-01-03 DIAGNOSIS — M41.9 SCOLIOSIS, UNSPECIFIED SCOLIOSIS TYPE, UNSPECIFIED SPINAL REGION: ICD-10-CM

## 2020-01-03 DIAGNOSIS — N30.90 CYSTITIS: Primary | ICD-10-CM

## 2020-01-03 DIAGNOSIS — E89.0 POSTOPERATIVE HYPOTHYROIDISM: ICD-10-CM

## 2020-01-03 LAB
BILIRUB SERPL-MCNC: NORMAL MG/DL
BLOOD, POC UA: NORMAL
GLUCOSE UR QL STRIP: NORMAL
KETONES UR QL STRIP: NORMAL
LEUKOCYTE ESTERASE URINE, POC: NORMAL
NITRITE, POC UA: NORMAL
PH, POC UA: 5
PROTEIN, POC: 30
SPECIFIC GRAVITY, POC UA: 1.02
UROBILINOGEN, POC UA: 0.2

## 2020-01-03 PROCEDURE — 99214 OFFICE O/P EST MOD 30 MIN: CPT | Mod: 25,S$GLB,, | Performed by: FAMILY MEDICINE

## 2020-01-03 PROCEDURE — 81003 POCT URINALYSIS: ICD-10-PCS | Mod: QW,S$GLB,, | Performed by: FAMILY MEDICINE

## 2020-01-03 PROCEDURE — 99214 PR OFFICE/OUTPT VISIT, EST, LEVL IV, 30-39 MIN: ICD-10-PCS | Mod: 25,S$GLB,, | Performed by: FAMILY MEDICINE

## 2020-01-03 PROCEDURE — 81003 URINALYSIS AUTO W/O SCOPE: CPT | Mod: QW,S$GLB,, | Performed by: FAMILY MEDICINE

## 2020-01-03 RX ORDER — NITROFURANTOIN 25; 75 MG/1; MG/1
100 CAPSULE ORAL 2 TIMES DAILY
Qty: 10 CAPSULE | Refills: 0 | Status: SHIPPED | OUTPATIENT
Start: 2020-01-03 | End: 2020-07-01

## 2020-01-03 RX ORDER — PHENAZOPYRIDINE HYDROCHLORIDE 100 MG/1
100 TABLET, FILM COATED ORAL 3 TIMES DAILY PRN
Qty: 6 TABLET | Refills: 0 | Status: SHIPPED | OUTPATIENT
Start: 2020-01-03 | End: 2020-01-13

## 2020-01-03 NOTE — PROGRESS NOTES
Subjective:      Patient ID: Kimber Herndon is a 29 y.o. female.    Chief Complaint: Urinary Tract Infection (possible uti)      HPI:  29-year-old white female past history of anxiety, thyroid cancer, depression and hypothyroidism who presents with multiple complaints.  Reports urinary frequency and urgency.  Has had multiple UTIs over the last several months.  Has had these for years.  Reports Pyridium helps with the symptoms.  Has been given antibiotics in the past.  They seem to help temporarily.  Reports constipation has improved with Linzess.  She does have to drink a lot a water with it.  She complains of feeling tired and fatigued.    Past Medical History:   Diagnosis Date    Anxiety     Cancer     thyroid     Depression     Hypothyroidism      Past Surgical History:   Procedure Laterality Date    MOUTH SURGERY      TOTAL THYROIDECTOMY      TUBAL LIGATION      bilateral tubes removed      Family History   Problem Relation Age of Onset    Arthritis Mother     Autoimmune disease Mother     Lupus Mother     Hypothyroidism Mother     Hypertension Father     Alcohol abuse Father     Hypothyroidism Father     Hypothyroidism Sister     Fibromyalgia Son     Asthma Son     Stroke Maternal Grandfather     Heart disease Maternal Grandfather     Cancer Paternal Grandmother      Social History     Socioeconomic History    Marital status:      Spouse name: Not on file    Number of children: Not on file    Years of education: Not on file    Highest education level: Not on file   Occupational History    Not on file   Social Needs    Financial resource strain: Not on file    Food insecurity:     Worry: Not on file     Inability: Not on file    Transportation needs:     Medical: Not on file     Non-medical: Not on file   Tobacco Use    Smoking status: Former Smoker     Last attempt to quit:      Years since quittin.0   Substance and Sexual Activity    Alcohol use:  "Not Currently     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    Drug use: Never    Sexual activity: Not on file   Lifestyle    Physical activity:     Days per week: Not on file     Minutes per session: Not on file    Stress: Not on file   Relationships    Social connections:     Talks on phone: Not on file     Gets together: Not on file     Attends Buddhist service: Not on file     Active member of club or organization: Not on file     Attends meetings of clubs or organizations: Not on file     Relationship status: Not on file   Other Topics Concern    Not on file   Social History Narrative    Not on file     Review of patient's allergies indicates:   Allergen Reactions    Codeine Anaphylaxis       Review of Systems   Constitutional: Positive for fatigue. Negative for activity change, appetite change, chills and fever.   HENT: Negative for congestion, ear pain, postnasal drip, rhinorrhea, sinus pressure, sinus pain and sore throat.    Eyes: Negative for pain and redness.   Respiratory: Negative for cough, chest tightness and shortness of breath.    Cardiovascular: Negative for chest pain and leg swelling.   Gastrointestinal: Negative for abdominal distention, abdominal pain, constipation, diarrhea, nausea and vomiting.   Endocrine: Negative for cold intolerance and heat intolerance.   Genitourinary: Positive for frequency, pelvic pain and urgency. Negative for dysuria and hematuria.   Musculoskeletal: Negative for arthralgias, back pain and joint swelling.   Skin: Negative for pallor.   Neurological: Negative for dizziness and light-headedness.   Psychiatric/Behavioral: Negative for agitation, decreased concentration and hallucinations. The patient is not nervous/anxious.        Objective:       /70 (BP Location: Right arm, Patient Position: Sitting, BP Method: Medium (Manual))   Pulse 79   Temp 98.6 °F (37 °C) (Oral)   Resp 18   Ht 5' 8" (1.727 m)   Wt 69.4 kg (153 lb)   SpO2 98%   BMI 23.26 kg/m²   Physical Exam "   Constitutional: She is oriented to person, place, and time. She appears well-developed and well-nourished.   HENT:   Head: Normocephalic and atraumatic.   Nose: Nose normal.   Eyes: Pupils are equal, round, and reactive to light. Conjunctivae and EOM are normal.   Neck: Normal range of motion. Neck supple.   Cardiovascular: Normal rate, regular rhythm and normal heart sounds.   Pulmonary/Chest: Effort normal and breath sounds normal.   Abdominal: Soft.   Musculoskeletal: Normal range of motion.   Neurological: She is alert and oriented to person, place, and time.   Skin: Skin is warm and dry.   Psychiatric: She has a normal mood and affect. Her behavior is normal. Thought content normal.       Assessment:     1. Cystitis    2. Chronic idiopathic constipation    3. Scoliosis, unspecified scoliosis type, unspecified spinal region    4. Postoperative hypothyroidism        Plan:   Cystitis  -     Urine culture  -     POCT Urinalysis  -     nitrofurantoin, macrocrystal-monohydrate, (MACROBID) 100 MG capsule; Take 1 capsule (100 mg total) by mouth 2 (two) times daily.  Dispense: 10 capsule; Refill: 0  -     phenazopyridine (PYRIDIUM) 100 MG tablet; Take 1 tablet (100 mg total) by mouth 3 (three) times daily as needed for Pain.  Dispense: 6 tablet; Refill: 0    Chronic idiopathic constipation    Scoliosis, unspecified scoliosis type, unspecified spinal region    Postoperative hypothyroidism      Positive nitrates and leuk esterase.  Send for culture.  Peridium as needed.  Start Macrobid.    Concern over interstitial cystitis.    Increase Linzess to 145.  Samples provided.    Will recheck thyroid function tests on follow-up.    Medication List with Changes/Refills   New Medications    NITROFURANTOIN, MACROCRYSTAL-MONOHYDRATE, (MACROBID) 100 MG CAPSULE    Take 1 capsule (100 mg total) by mouth 2 (two) times daily.    PHENAZOPYRIDINE (PYRIDIUM) 100 MG TABLET    Take 1 tablet (100 mg total) by mouth 3 (three) times daily as  needed for Pain.   Current Medications    CITALOPRAM (CELEXA) 20 MG TABLET    Take 1.5 tablets (30 mg total) by mouth once daily.    KETOROLAC (TORADOL) 10 MG TABLET        LEVOTHYROXINE (SYNTHROID) 100 MCG TABLET    Take 1 tablet (100 mcg total) by mouth once daily.    LINACLOTIDE (LINZESS) 72 MCG CAP CAPSULE    Take 1 capsule (72 mcg total) by mouth once daily.    LIOTHYRONINE (CYTOMEL) 5 MCG TAB    TAKE 1 TABLET BY MOUTH EVERY DAY    SYNTHROID 88 MCG TABLET    Take 1 tablet (88 mcg total) by mouth once daily.            Disclaimer: This note may have been prepared using voice recognition software, it may have not been extensively proofed, as such there could be errors within the text such as sound alike errors.

## 2020-01-05 LAB — URINE CULTURE, ROUTINE: NORMAL

## 2020-02-03 ENCOUNTER — OFFICE VISIT (OUTPATIENT)
Dept: FAMILY MEDICINE | Facility: CLINIC | Age: 30
End: 2020-02-03
Payer: MEDICAID

## 2020-02-03 VITALS
BODY MASS INDEX: 23.64 KG/M2 | TEMPERATURE: 98 F | HEART RATE: 73 BPM | SYSTOLIC BLOOD PRESSURE: 124 MMHG | OXYGEN SATURATION: 98 % | RESPIRATION RATE: 18 BRPM | WEIGHT: 156 LBS | HEIGHT: 68 IN | DIASTOLIC BLOOD PRESSURE: 72 MMHG

## 2020-02-03 DIAGNOSIS — D72.819 LEUKOPENIA, UNSPECIFIED TYPE: ICD-10-CM

## 2020-02-03 DIAGNOSIS — M19.90 ARTHRITIS: ICD-10-CM

## 2020-02-03 DIAGNOSIS — N30.10 INTERSTITIAL CYSTITIS: ICD-10-CM

## 2020-02-03 DIAGNOSIS — R35.0 URINARY FREQUENCY: ICD-10-CM

## 2020-02-03 DIAGNOSIS — E89.0 POSTOPERATIVE HYPOTHYROIDISM: Primary | ICD-10-CM

## 2020-02-03 LAB
BILIRUB SERPL-MCNC: NORMAL MG/DL
BLOOD, POC UA: NORMAL
GLUCOSE UR QL STRIP: NORMAL
KETONES UR QL STRIP: NORMAL
LEUKOCYTE ESTERASE URINE, POC: NORMAL
NITRITE, POC UA: NORMAL
PH, POC UA: 5
PROTEIN, POC: NORMAL
SPECIFIC GRAVITY, POC UA: 1.01
UROBILINOGEN, POC UA: 0.2

## 2020-02-03 PROCEDURE — 81003 URINALYSIS AUTO W/O SCOPE: CPT | Mod: QW,S$GLB,, | Performed by: FAMILY MEDICINE

## 2020-02-03 PROCEDURE — 99214 OFFICE O/P EST MOD 30 MIN: CPT | Mod: 25,S$GLB,, | Performed by: FAMILY MEDICINE

## 2020-02-03 PROCEDURE — 81003 POCT URINALYSIS: ICD-10-PCS | Mod: QW,S$GLB,, | Performed by: FAMILY MEDICINE

## 2020-02-03 PROCEDURE — 99214 PR OFFICE/OUTPT VISIT, EST, LEVL IV, 30-39 MIN: ICD-10-PCS | Mod: 25,S$GLB,, | Performed by: FAMILY MEDICINE

## 2020-02-03 NOTE — PROGRESS NOTES
Subjective:      Patient ID: Kimber Herndon is a 29 y.o. female.    Chief Complaint: Follow-up      HPI:  29-year-old white female past history of thyroid cancers hypothyroidism depression and scoliosis who presents multiple complaints.  Was recently evaluated by Neurosurgery.  She is set to undergo surgical intervention soon.  She complains of continued urinary frequency and urgency.  She read up and does appear she could have interstitial cystitis.  She has tried interstitial cystitis diet and has noticed some improvement.  She still however urinates multiple times throughout the day.  She drinks around 50 oz of water a day.  She reports Linzess is no longer helping.  She stopped taking it.  Was giving her vivid dreams.  She reports she noticed a rash on left side of her abdomen.  This has since improved.  She does have a rash on her right hand.    Past Medical History:   Diagnosis Date    Anxiety     Cancer     thyroid     Depression     Hypothyroidism      Past Surgical History:   Procedure Laterality Date    MOUTH SURGERY      TOTAL THYROIDECTOMY      TUBAL LIGATION      bilateral tubes removed      Family History   Problem Relation Age of Onset    Arthritis Mother     Autoimmune disease Mother     Lupus Mother     Hypothyroidism Mother     Hypertension Father     Alcohol abuse Father     Hypothyroidism Father     Hypothyroidism Sister     Fibromyalgia Son     Asthma Son     Stroke Maternal Grandfather     Heart disease Maternal Grandfather     Cancer Paternal Grandmother      Social History     Socioeconomic History    Marital status:      Spouse name: Not on file    Number of children: Not on file    Years of education: Not on file    Highest education level: Not on file   Occupational History    Not on file   Social Needs    Financial resource strain: Not on file    Food insecurity:     Worry: Not on file     Inability: Not on file    Transportation needs:      Medical: Not on file     Non-medical: Not on file   Tobacco Use    Smoking status: Former Smoker     Last attempt to quit:      Years since quittin.0   Substance and Sexual Activity    Alcohol use: Not Currently     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    Drug use: Never    Sexual activity: Not on file   Lifestyle    Physical activity:     Days per week: Not on file     Minutes per session: Not on file    Stress: Not on file   Relationships    Social connections:     Talks on phone: Not on file     Gets together: Not on file     Attends Quaker service: Not on file     Active member of club or organization: Not on file     Attends meetings of clubs or organizations: Not on file     Relationship status: Not on file   Other Topics Concern    Not on file   Social History Narrative    Not on file     Review of patient's allergies indicates:   Allergen Reactions    Codeine Anaphylaxis       Review of Systems   Constitutional: Negative for activity change, appetite change, chills, fatigue and fever.   HENT: Negative for congestion, ear pain, postnasal drip, rhinorrhea, sinus pressure, sinus pain and sore throat.    Eyes: Negative for pain and redness.   Respiratory: Negative for cough, chest tightness and shortness of breath.    Cardiovascular: Negative for chest pain and leg swelling.   Gastrointestinal: Positive for constipation. Negative for abdominal distention, abdominal pain, diarrhea, nausea and vomiting.   Endocrine: Negative for cold intolerance and heat intolerance.   Genitourinary: Positive for frequency. Negative for dysuria and hematuria.   Musculoskeletal: Positive for back pain. Negative for arthralgias and joint swelling.   Skin: Positive for rash. Negative for pallor.   Neurological: Negative for dizziness and light-headedness.   Psychiatric/Behavioral: Negative for agitation, decreased concentration and hallucinations. The patient is not nervous/anxious.        Objective:       /72 (BP  "Location: Left arm, Patient Position: Sitting, BP Method: Medium (Manual))   Pulse 73   Temp 98.3 °F (36.8 °C) (Oral)   Resp 18   Ht 5' 8" (1.727 m)   Wt 70.8 kg (156 lb)   SpO2 98%   BMI 23.72 kg/m²   Physical Exam   Constitutional: She is oriented to person, place, and time. She appears well-developed and well-nourished.   HENT:   Head: Normocephalic and atraumatic.   Nose: Nose normal.   Eyes: Pupils are equal, round, and reactive to light. Conjunctivae and EOM are normal.   Neck: Normal range of motion. Neck supple.   Pulmonary/Chest: Effort normal.   Abdominal: Soft.   Musculoskeletal: Normal range of motion.   Neurological: She is alert and oriented to person, place, and time.   Skin: Skin is warm and dry.   Slightly irritated skin over right palm.  Appears to be foreign body.   Psychiatric: She has a normal mood and affect. Her behavior is normal. Thought content normal.       Assessment:     1. Postoperative hypothyroidism    2. Arthritis    3. Leukopenia, unspecified type    4. Urinary frequency    5. Interstitial cystitis        Plan:   Postoperative hypothyroidism  -     TSH; Future; Expected date: 02/03/2020  -     T4, free; Future; Expected date: 02/03/2020    Arthritis  -     Sedimentation rate; Future; Expected date: 02/03/2020  -     C-reactive protein; Future; Expected date: 02/03/2020  -     LELE IFA, w/Rflx to Pattern/Titer; Future; Expected date: 02/03/2020    Leukopenia, unspecified type  -     CBC auto differential; Future; Expected date: 02/03/2020    Urinary frequency  -     POCT Urinalysis    Interstitial cystitis  -     POCT Urinalysis      She reports that his for probably a hair splinter in her right hand.  Will remove at home.    Labs pending.    Consider amitriptyline for I see.  I see diet discussed.  Puff score of 22    Labs pending.    Stop Linzess.    Follow up in 6 weeks.    Medication List with Changes/Refills   Current Medications    CITALOPRAM (CELEXA) 20 MG TABLET    Take " 1.5 tablets (30 mg total) by mouth once daily.    KETOROLAC (TORADOL) 10 MG TABLET        LEVOTHYROXINE (SYNTHROID) 100 MCG TABLET    Take 1 tablet (100 mcg total) by mouth once daily.    LINACLOTIDE (LINZESS) 72 MCG CAP CAPSULE    Take 1 capsule (72 mcg total) by mouth once daily.    LIOTHYRONINE (CYTOMEL) 5 MCG TAB    TAKE 1 TABLET BY MOUTH EVERY DAY    NITROFURANTOIN, MACROCRYSTAL-MONOHYDRATE, (MACROBID) 100 MG CAPSULE    Take 1 capsule (100 mg total) by mouth 2 (two) times daily.    SYNTHROID 88 MCG TABLET    Take 1 tablet (88 mcg total) by mouth once daily.            Disclaimer: This note may have been prepared using voice recognition software, it may have not been extensively proofed, as such there could be errors within the text such as sound alike errors.

## 2020-02-04 LAB
ABS NRBC COUNT: 0 X 10 3/UL (ref 0–0.01)
ABSOLUTE BASOPHIL: 0.01 X 10 3/UL (ref 0–0.22)
ABSOLUTE EOSINOPHIL: 0.06 X 10 3/UL (ref 0.04–0.54)
ABSOLUTE IMMATURE GRAN: 0 X 10 3/UL (ref 0–0.04)
ABSOLUTE LYMPHOCYTE: 1.16 X 10 3/UL (ref 0.86–4.75)
ABSOLUTE MONOCYTE: 0.28 X 10 3/UL (ref 0.22–1.08)
ANA SER-ACNC: NEGATIVE
BASOPHILS NFR BLD: 0.3 % (ref 0.2–1.2)
CRP QUALITATIVE: NEGATIVE MG/L
CRP QUANTITATIVE: <0.3 MG/L
EOSINOPHIL NFR BLD: 1.9 % (ref 0.7–7)
HCT VFR BLD AUTO: 36.6 % (ref 37–47)
HGB BLD-MCNC: 11.9 G/DL (ref 12–16)
IMMATURE GRANULOCYTES: 0 % (ref 0–0.5)
LYMPHOCYTES NFR BLD: 37.4 % (ref 19.3–53.1)
MCH RBC QN AUTO: 30.6 PG (ref 27–32)
MCHC RBC AUTO-ENTMCNC: 32.5 G/DL (ref 32–36)
MCV RBC AUTO: 94.1 FL (ref 82–100)
MONOCYTES NFR BLD: 9 % (ref 4.7–12.5)
NEUTROPHILS ABSOLUTE COUNT: 1.59 X 10 3/UL (ref 2.15–7.56)
NEUTROPHILS NFR BLD: 51.4 %
NUCLEATED RED BLOOD CELLS: 0 /100 WBC (ref 0–0.2)
PLATELET # BLD AUTO: 179 X 10 3/UL (ref 135–400)
RBC # BLD AUTO: 3.89 X 10 6/UL (ref 4.2–5.4)
RDW-SD: 39.7 FL (ref 37–54)
SED RATE (WESTERGREN): 5 MM/HR (ref 0–20)
T4, FREE: 1.35 NG/DL (ref 0.93–1.7)
TSH SERPL DL<=0.005 MIU/L-ACNC: 0.23 UIU/ML (ref 0.27–4.2)
WBC # BLD: 3.1 X 10 3/UL (ref 4.3–10.8)

## 2020-02-28 ENCOUNTER — TELEPHONE (OUTPATIENT)
Dept: FAMILY MEDICINE | Facility: CLINIC | Age: 30
End: 2020-02-28

## 2020-02-28 DIAGNOSIS — F41.9 ANXIETY: ICD-10-CM

## 2020-02-28 RX ORDER — CITALOPRAM 40 MG/1
40 TABLET, FILM COATED ORAL DAILY
Qty: 30 TABLET | Refills: 3 | Status: SHIPPED | OUTPATIENT
Start: 2020-02-28 | End: 2020-07-01 | Stop reason: SDUPTHER

## 2020-02-28 NOTE — TELEPHONE ENCOUNTER
----- Message from Ge Valladares MD sent at 2/28/2020 12:22 PM CST -----  Contact: Kimber Hoover sent to pharmacy.    ----- Message -----  From: Evelyn Tatum LPN  Sent: 2/28/2020  11:16 AM CST  To: Ge Valladares MD    Would like to up the dosage of there celexa to 40 mg, she is on 30 which she takes 1.5 tabs once a day.

## 2020-02-28 NOTE — TELEPHONE ENCOUNTER
Called no answer unable to leave a message on voicemail to let patient know that medication was increase and sent to her pharmacy.

## 2020-03-26 ENCOUNTER — TELEPHONE (OUTPATIENT)
Dept: FAMILY MEDICINE | Facility: CLINIC | Age: 30
End: 2020-03-26

## 2020-03-26 NOTE — TELEPHONE ENCOUNTER
----- Message from Mayela Treadwell sent at 3/26/2020  3:23 PM CDT -----  Needs refill on her SYNTHROID 88 mcg tablet and liothyronine (CYTOMEL) 5 MCG Tab

## 2020-03-26 NOTE — TELEPHONE ENCOUNTER
Called and talked with patient and informed that medication refills were called in to her pharmacy and one additional refill. Patient states thank you and understanding. Medication called in to [facility] [address].

## 2020-06-02 ENCOUNTER — TELEPHONE (OUTPATIENT)
Dept: FAMILY MEDICINE | Facility: CLINIC | Age: 30
End: 2020-06-02

## 2020-06-02 NOTE — TELEPHONE ENCOUNTER
----- Message from Nikhil Tesfaye sent at 6/1/2020  4:38 PM CDT -----  Contact: Pt  Please Kimber to discuss a question she has. She refused to go into detail with me on the phone [phone] (home).

## 2020-06-02 NOTE — TELEPHONE ENCOUNTER
Called patient and informed that her medication was called in to her pharmacy. She states that she is having surgery and she need multi refill on her medication. Patient have appointment coming up.

## 2020-07-01 ENCOUNTER — OFFICE VISIT (OUTPATIENT)
Dept: FAMILY MEDICINE | Facility: CLINIC | Age: 30
End: 2020-07-01
Payer: MEDICAID

## 2020-07-01 VITALS
TEMPERATURE: 99 F | DIASTOLIC BLOOD PRESSURE: 84 MMHG | WEIGHT: 154.81 LBS | HEIGHT: 68 IN | SYSTOLIC BLOOD PRESSURE: 112 MMHG | OXYGEN SATURATION: 99 % | RESPIRATION RATE: 16 BRPM | HEART RATE: 74 BPM | BODY MASS INDEX: 23.46 KG/M2

## 2020-07-01 DIAGNOSIS — E89.0 POSTOPERATIVE HYPOTHYROIDISM: Primary | ICD-10-CM

## 2020-07-01 DIAGNOSIS — Z79.899 ON LONG TERM DRUG THERAPY: ICD-10-CM

## 2020-07-01 DIAGNOSIS — Z01.818 PRE-OP TESTING: ICD-10-CM

## 2020-07-01 DIAGNOSIS — F41.9 ANXIETY: ICD-10-CM

## 2020-07-01 PROCEDURE — 99214 OFFICE O/P EST MOD 30 MIN: CPT | Mod: S$GLB,,, | Performed by: STUDENT IN AN ORGANIZED HEALTH CARE EDUCATION/TRAINING PROGRAM

## 2020-07-01 PROCEDURE — 99214 PR OFFICE/OUTPT VISIT, EST, LEVL IV, 30-39 MIN: ICD-10-PCS | Mod: S$GLB,,, | Performed by: STUDENT IN AN ORGANIZED HEALTH CARE EDUCATION/TRAINING PROGRAM

## 2020-07-01 RX ORDER — CITALOPRAM 40 MG/1
40 TABLET, FILM COATED ORAL DAILY
Qty: 30 TABLET | Refills: 3 | Status: SHIPPED | OUTPATIENT
Start: 2020-07-01 | End: 2020-09-29 | Stop reason: SDUPTHER

## 2020-07-01 RX ORDER — LIOTHYRONINE SODIUM 5 UG/1
5 TABLET ORAL DAILY
Qty: 30 TABLET | Refills: 3 | Status: SHIPPED | OUTPATIENT
Start: 2020-07-01 | End: 2020-10-01 | Stop reason: SDUPTHER

## 2020-07-01 RX ORDER — LEVOTHYROXINE SODIUM 100 UG/1
100 TABLET ORAL DAILY
Qty: 30 TABLET | Refills: 3 | Status: SHIPPED | OUTPATIENT
Start: 2020-07-01 | End: 2020-10-01 | Stop reason: SDUPTHER

## 2020-07-01 NOTE — PROGRESS NOTES
Subjective:      Patient ID: Kimber Herndon is a 29 y.o. female.    Chief Complaint: Other (medication refill)      HPI:  29-year-old female past medical history of anxiety, depression, and hypothyroidism presents today for follow-up of chronic medical conditions.  States she is doing well with current medications.  Request refills.  Feels like anxiety well controlled with Celexa.  States she is scheduled for surgery next Tuesday in [place]. She needs to have a COVID test completed before her surgery.  Denies exposure.  Denies cough, shortness of breath, fever.  Denies any acute complaints at this time.    Past Medical History:   Diagnosis Date    Anxiety     Cancer     thyroid     Depression     Hypothyroidism      Past Surgical History:   Procedure Laterality Date    MOUTH SURGERY      TOTAL THYROIDECTOMY      TUBAL LIGATION      bilateral tubes removed      Family History   Problem Relation Age of Onset    Arthritis Mother     Autoimmune disease Mother     Lupus Mother     Hypothyroidism Mother     Hypertension Father     Alcohol abuse Father     Hypothyroidism Father     Hypothyroidism Sister     Fibromyalgia Son     Asthma Son     Stroke Maternal Grandfather     Heart disease Maternal Grandfather     Cancer Paternal Grandmother      Social History     Socioeconomic History    Marital status:      Spouse name: Not on file    Number of children: Not on file    Years of education: Not on file    Highest education level: Not on file   Occupational History    Not on file   Social Needs    Financial resource strain: Not on file    Food insecurity     Worry: Not on file     Inability: Not on file    Transportation needs     Medical: Not on file     Non-medical: Not on file   Tobacco Use    Smoking status: Former Smoker     Quit date:      Years since quittin.5   Substance and Sexual Activity    Alcohol use: Not Currently     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    Drug use:  "Never    Sexual activity: Not on file   Lifestyle    Physical activity     Days per week: Not on file     Minutes per session: Not on file    Stress: Not on file   Relationships    Social connections     Talks on phone: Not on file     Gets together: Not on file     Attends Alevism service: Not on file     Active member of club or organization: Not on file     Attends meetings of clubs or organizations: Not on file     Relationship status: Not on file   Other Topics Concern    Not on file   Social History Narrative    Not on file     Review of patient's allergies indicates:   Allergen Reactions    Codeine Anaphylaxis       Review of Systems   Constitutional: Negative for activity change, appetite change, fatigue and unexpected weight change.   HENT: Negative for sinus pain.    Respiratory: Negative for cough and shortness of breath.    Cardiovascular: Negative for chest pain.   Gastrointestinal: Negative for abdominal pain, nausea and vomiting.   Genitourinary: Negative for difficulty urinating.   Musculoskeletal: Negative for arthralgias and myalgias.   Neurological: Negative for dizziness and headaches.   Psychiatric/Behavioral: The patient is not nervous/anxious.        Objective:       /84 (BP Location: Right arm, Patient Position: Sitting, BP Method: Medium (Manual))   Pulse 74   Temp 98.9 °F (37.2 °C) (Temporal)   Resp 16   Ht 5' 8" (1.727 m)   Wt 70.2 kg (154 lb 12.8 oz)   SpO2 99%   BMI 23.54 kg/m²   Physical Exam  Vitals signs and nursing note reviewed.   Constitutional:       Appearance: Normal appearance. She is well-developed.   HENT:      Head: Normocephalic and atraumatic.   Eyes:      Extraocular Movements: Extraocular movements intact.      Conjunctiva/sclera: Conjunctivae normal.      Pupils: Pupils are equal, round, and reactive to light.   Neck:      Musculoskeletal: Normal range of motion and neck supple.   Cardiovascular:      Rate and Rhythm: Normal rate and regular " rhythm.      Heart sounds: Normal heart sounds.   Pulmonary:      Effort: Pulmonary effort is normal.      Breath sounds: Normal breath sounds.   Abdominal:      Palpations: Abdomen is soft.   Musculoskeletal: Normal range of motion.   Skin:     General: Skin is warm and dry.   Neurological:      General: No focal deficit present.      Mental Status: She is alert and oriented to person, place, and time.   Psychiatric:         Mood and Affect: Mood normal.         Assessment:     1. Postoperative hypothyroidism    2. Anxiety    3. Pre-op testing    4. On long term drug therapy        Plan:   Postoperative hypothyroidism  -     levothyroxine (SYNTHROID) 100 MCG tablet; Take 1 tablet (100 mcg total) by mouth once daily.  Dispense: 30 tablet; Refill: 3  -     liothyronine (CYTOMEL) 5 MCG Tab; Take 1 tablet (5 mcg total) by mouth once daily.  Dispense: 30 tablet; Refill: 3  -     T4, free  -     TSH    Anxiety  -     citalopram (CELEXA) 40 MG tablet; Take 1 tablet (40 mg total) by mouth once daily.  Dispense: 30 tablet; Refill: 3    Pre-op testing  -     COVID-19 (SARS CoV-2) IgG Antibody; Future; Expected date: 07/01/2020    On long term drug therapy  -     CBC auto differential  -     Comprehensive metabolic panel      Labs pending.    Refill meds.    Patient informed COVID results may not be resulted in time for surgery.  Will fax results once we receive them.  Medication List with Changes/Refills   Current Medications    KETOROLAC (TORADOL) 10 MG TABLET        LINACLOTIDE (LINZESS) 72 MCG CAP CAPSULE    Take 1 capsule (72 mcg total) by mouth once daily.   Changed and/or Refilled Medications    Modified Medication Previous Medication    CITALOPRAM (CELEXA) 40 MG TABLET citalopram (CELEXA) 40 MG tablet       Take 1 tablet (40 mg total) by mouth once daily.    Take 1 tablet (40 mg total) by mouth once daily.    LEVOTHYROXINE (SYNTHROID) 100 MCG TABLET levothyroxine (SYNTHROID) 100 MCG tablet       Take 1 tablet (100 mcg  total) by mouth once daily.    Take 1 tablet (100 mcg total) by mouth once daily.    LIOTHYRONINE (CYTOMEL) 5 MCG TAB liothyronine (CYTOMEL) 5 MCG Tab       Take 1 tablet (5 mcg total) by mouth once daily.    TAKE 1 TABLET BY MOUTH EVERY DAY   Discontinued Medications    NITROFURANTOIN, MACROCRYSTAL-MONOHYDRATE, (MACROBID) 100 MG CAPSULE    Take 1 capsule (100 mg total) by mouth 2 (two) times daily.              Disclaimer: This note may have been prepared using voice recognition software, it may have not been extensively proofed, as such there could be errors within the text such as sound alike errors.

## 2020-07-02 LAB
ABS NRBC COUNT: 0 X 10 3/UL (ref 0–0.01)
ABSOLUTE BASOPHIL: 0.01 X 10 3/UL (ref 0–0.22)
ABSOLUTE EOSINOPHIL: 0.05 X 10 3/UL (ref 0.04–0.54)
ABSOLUTE IMMATURE GRAN: 0.01 X 10 3/UL (ref 0–0.04)
ABSOLUTE LYMPHOCYTE: 1.22 X 10 3/UL (ref 0.86–4.75)
ABSOLUTE MONOCYTE: 0.19 X 10 3/UL (ref 0.22–1.08)
ALBUMIN SERPL-MCNC: 4.7 G/DL (ref 3.5–5.2)
ALBUMIN/GLOB SERPL ELPH: 2.6 {RATIO} (ref 1–2.7)
ALP ISOS SERPL LEV INH-CCNC: 77 U/L (ref 35–105)
ALT (SGPT): 13 U/L (ref 0–33)
ANION GAP SERPL CALC-SCNC: 10 MMOL/L (ref 8–17)
AST SERPL-CCNC: 16 U/L (ref 0–32)
BASOPHILS NFR BLD: 0.3 % (ref 0.2–1.2)
BILIRUBIN, TOTAL: 0.21 MG/DL (ref 0–1.2)
BUN/CREAT SERPL: 20.2 (ref 6–20)
CALCIUM SERPL-MCNC: 9.3 MG/DL (ref 8.6–10.2)
CARBON DIOXIDE, CO2: 29 MMOL/L (ref 22–29)
CHLORIDE: 104 MMOL/L (ref 98–107)
COVID-19 (SARS-COV-2) TOTAL ANTIBODIES: NONREACTIVE
CREAT SERPL-MCNC: 0.57 MG/DL (ref 0.5–0.9)
EOSINOPHIL NFR BLD: 1.7 % (ref 0.7–7)
GFR ESTIMATION: 124.54
GLOBULIN: 1.8 G/DL (ref 1.5–4.5)
GLUCOSE: 80 MG/DL (ref 74–106)
HCT VFR BLD AUTO: 37.7 % (ref 37–47)
HGB BLD-MCNC: 11.9 G/DL (ref 12–16)
IMMATURE GRANULOCYTES: 0.3 % (ref 0–0.5)
LYMPHOCYTES NFR BLD: 41.2 % (ref 19.3–53.1)
MCH RBC QN AUTO: 29.7 PG (ref 27–32)
MCHC RBC AUTO-ENTMCNC: 31.6 G/DL (ref 32–36)
MCV RBC AUTO: 94 FL (ref 82–100)
MONOCYTES NFR BLD: 6.4 % (ref 4.7–12.5)
NEUTROPHILS ABSOLUTE COUNT: 1.48 X 10 3/UL (ref 2.15–7.56)
NEUTROPHILS NFR BLD: 50.1 %
NUCLEATED RED BLOOD CELLS: 0 /100 WBC (ref 0–0.2)
PLATELET # BLD AUTO: 188 X 10 3/UL (ref 135–400)
POTASSIUM: 4.5 MMOL/L (ref 3.5–5.1)
PROT SNV-MCNC: 6.5 G/DL (ref 6.4–8.3)
RBC # BLD AUTO: 4.01 X 10 6/UL (ref 4.2–5.4)
RDW-SD: 38.8 FL (ref 37–54)
SODIUM: 143 MMOL/L (ref 136–145)
T4, FREE: 1.18 NG/DL (ref 0.93–1.7)
TSH SERPL DL<=0.005 MIU/L-ACNC: 0.34 UIU/ML (ref 0.27–4.2)
UREA NITROGEN (BUN): 11.5 MG/DL (ref 6–20)
WBC # BLD: 2.96 X 10 3/UL (ref 4.3–10.8)

## 2020-07-06 PROBLEM — Z01.818 PREOP EXAMINATION: Status: ACTIVE | Noted: 2020-07-06

## 2020-08-19 PROBLEM — M43.25 FUSION OF SPINE OF THORACOLUMBAR REGION: Status: ACTIVE | Noted: 2020-08-19

## 2020-09-29 ENCOUNTER — OFFICE VISIT (OUTPATIENT)
Dept: FAMILY MEDICINE | Facility: CLINIC | Age: 30
End: 2020-09-29
Payer: MEDICAID

## 2020-09-29 VITALS
OXYGEN SATURATION: 98 % | BODY MASS INDEX: 23.19 KG/M2 | HEIGHT: 68 IN | TEMPERATURE: 98 F | WEIGHT: 153 LBS | DIASTOLIC BLOOD PRESSURE: 68 MMHG | HEART RATE: 87 BPM | RESPIRATION RATE: 18 BRPM | SYSTOLIC BLOOD PRESSURE: 124 MMHG

## 2020-09-29 DIAGNOSIS — Z79.899 ON LONG TERM DRUG THERAPY: ICD-10-CM

## 2020-09-29 DIAGNOSIS — E89.0 POSTOPERATIVE HYPOTHYROIDISM: Primary | ICD-10-CM

## 2020-09-29 DIAGNOSIS — D72.819 LEUKOPENIA, UNSPECIFIED TYPE: ICD-10-CM

## 2020-09-29 DIAGNOSIS — J34.89 STAPHYLOCOCCUS INFECTION OF NOSE: ICD-10-CM

## 2020-09-29 DIAGNOSIS — F41.9 ANXIETY: ICD-10-CM

## 2020-09-29 DIAGNOSIS — B95.8 STAPHYLOCOCCUS INFECTION OF NOSE: ICD-10-CM

## 2020-09-29 PROCEDURE — 99214 PR OFFICE/OUTPT VISIT, EST, LEVL IV, 30-39 MIN: ICD-10-PCS | Mod: S$GLB,,, | Performed by: FAMILY MEDICINE

## 2020-09-29 PROCEDURE — 99214 OFFICE O/P EST MOD 30 MIN: CPT | Mod: S$GLB,,, | Performed by: FAMILY MEDICINE

## 2020-09-29 RX ORDER — CITALOPRAM 40 MG/1
40 TABLET, FILM COATED ORAL DAILY
Qty: 30 TABLET | Refills: 3 | Status: SHIPPED | OUTPATIENT
Start: 2020-09-29 | End: 2021-02-22 | Stop reason: SDUPTHER

## 2020-09-29 RX ORDER — MUPIROCIN 20 MG/G
OINTMENT TOPICAL 3 TIMES DAILY
Qty: 30 G | Refills: 0 | Status: SHIPPED | OUTPATIENT
Start: 2020-09-29 | End: 2022-01-11

## 2020-09-29 RX ORDER — HYDROCODONE BITARTRATE AND ACETAMINOPHEN 7.5; 325 MG/1; MG/1
1 TABLET ORAL EVERY 6 HOURS PRN
COMMUNITY
Start: 2020-08-08 | End: 2021-02-22

## 2020-09-29 RX ORDER — TRAMADOL HYDROCHLORIDE 50 MG/1
50 TABLET ORAL EVERY 6 HOURS PRN
COMMUNITY
Start: 2020-08-08 | End: 2021-02-22

## 2020-09-29 RX ORDER — METHOCARBAMOL 750 MG/1
750 TABLET, FILM COATED ORAL NIGHTLY
COMMUNITY
Start: 2020-08-25 | End: 2020-10-01 | Stop reason: SDUPTHER

## 2020-09-29 NOTE — PROGRESS NOTES
Subjective:      Patient ID: Kimber Herndon is a 30 y.o. female.    Chief Complaint: Other (sore in nose/ medication refills )      HPI:  30-year-old white female past history of thyroid cancer, depression, hypothyroidism, anxiety and scoliosis who presents for staph infection.  Has had recurrent staph infections in her nose and body.  Recently started with infection her nose.  Gets irritated times.  Currently getting better.  Had her scoliosis surgery.  She is doing well.  She is having regular bowel movements.  Her back pain has much improved.  She is getting around better.  She is not using a cane at this time.  She has been compliant with her thyroid meds.  She did take her last dose today.  She needs a refill.    Past Medical History:   Diagnosis Date    Anxiety     Cancer     thyroid     Depression     Hypothyroidism      Past Surgical History:   Procedure Laterality Date    MOUTH SURGERY      SPINE SURGERY  07/07/2020    T4-L3 PSF w/ SUBLAMINAR BANDS & SPOs for scoli correction, 7/7/2020, Dr. Jimmy REDDING.    TOTAL THYROIDECTOMY      TUBAL LIGATION      bilateral tubes removed      Family History   Problem Relation Age of Onset    Arthritis Mother     Autoimmune disease Mother     Lupus Mother     Hypothyroidism Mother     Hypertension Father     Alcohol abuse Father     Hypothyroidism Father     Hypothyroidism Sister     Fibromyalgia Son     Asthma Son     Stroke Maternal Grandfather     Heart disease Maternal Grandfather     Cancer Paternal Grandmother      Social History     Socioeconomic History    Marital status:      Spouse name: Not on file    Number of children: Not on file    Years of education: Not on file    Highest education level: Not on file   Occupational History    Not on file   Social Needs    Financial resource strain: Not on file    Food insecurity     Worry: Not on file     Inability: Not on file    Transportation needs     Medical: Not on file      Non-medical: Not on file   Tobacco Use    Smoking status: Former Smoker     Quit date:      Years since quittin.7   Substance and Sexual Activity    Alcohol use: Not Currently     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    Drug use: Never    Sexual activity: Not on file   Lifestyle    Physical activity     Days per week: Not on file     Minutes per session: Not on file    Stress: Not on file   Relationships    Social connections     Talks on phone: Not on file     Gets together: Not on file     Attends Rastafari service: Not on file     Active member of club or organization: Not on file     Attends meetings of clubs or organizations: Not on file     Relationship status: Not on file   Other Topics Concern    Not on file   Social History Narrative    Not on file     Review of patient's allergies indicates:   Allergen Reactions    Codeine Anaphylaxis       Review of Systems   Constitutional: Negative for activity change, appetite change, chills, fatigue and fever.   HENT: Negative for congestion, ear pain, postnasal drip, rhinorrhea, sinus pressure, sinus pain and sore throat.    Eyes: Negative for pain and redness.   Respiratory: Negative for cough, chest tightness and shortness of breath.    Cardiovascular: Negative for chest pain and leg swelling.   Gastrointestinal: Negative for abdominal distention, abdominal pain, constipation, diarrhea, nausea and vomiting.   Endocrine: Negative for cold intolerance and heat intolerance.   Genitourinary: Negative for dysuria, frequency and hematuria.   Musculoskeletal: Positive for back pain. Negative for arthralgias and joint swelling.   Skin: Positive for wound. Negative for pallor.   Neurological: Negative for dizziness and light-headedness.   Psychiatric/Behavioral: Negative for agitation, decreased concentration and hallucinations. The patient is not nervous/anxious.        Objective:       /68 (BP Location: Right arm, Patient Position: Sitting, BP Method: Medium  "(Manual))   Pulse 87   Temp 98.3 °F (36.8 °C) (Oral)   Resp 18   Ht 5' 8" (1.727 m)   Wt 69.4 kg (153 lb)   SpO2 98%   BMI 23.26 kg/m²   Physical Exam  Constitutional:       Appearance: She is well-developed.   HENT:      Head: Normocephalic and atraumatic.      Comments: Small superficial ulceration noted to the left nostril     Nose: Nose normal.   Eyes:      Conjunctiva/sclera: Conjunctivae normal.      Pupils: Pupils are equal, round, and reactive to light.   Neck:      Musculoskeletal: Normal range of motion and neck supple.   Cardiovascular:      Rate and Rhythm: Normal rate and regular rhythm.      Heart sounds: Normal heart sounds.   Pulmonary:      Effort: Pulmonary effort is normal.      Breath sounds: Normal breath sounds.   Abdominal:      Palpations: Abdomen is soft.   Musculoskeletal: Normal range of motion.      Comments: In back brace, surgical scar noted over back.   Skin:     General: Skin is warm and dry.   Neurological:      Mental Status: She is alert and oriented to person, place, and time.   Psychiatric:         Behavior: Behavior normal.         Thought Content: Thought content normal.         Assessment:     1. Postoperative hypothyroidism    2. On long term drug therapy    3. Leukopenia, unspecified type    4. Anxiety    5. Staphylococcus infection of nose        Plan:   Postoperative hypothyroidism  -     TSH; Future; Expected date: 09/29/2020  -     T4, free; Future; Expected date: 09/29/2020    On long term drug therapy    Leukopenia, unspecified type  -     CBC auto differential; Future; Expected date: 09/29/2020    Anxiety  -     citalopram (CELEXA) 40 MG tablet; Take 1 tablet (40 mg total) by mouth once daily.  Dispense: 30 tablet; Refill: 3    Staphylococcus infection of nose  -     mupirocin (BACTROBAN) 2 % ointment; Apply topically 3 (three) times daily.  Dispense: 30 g; Refill: 0      Trial of Bactrim.    Refill Celexa.    Will go get blood work.  Refill thyroid medication " based on lab findings.    Medication List with Changes/Refills   New Medications    MUPIROCIN (BACTROBAN) 2 % OINTMENT    Apply topically 3 (three) times daily.   Current Medications    HYDROCODONE-ACETAMINOPHEN (NORCO) 7.5-325 MG PER TABLET    Take 1 tablet by mouth every 6 (six) hours as needed.    LEVOTHYROXINE (SYNTHROID) 100 MCG TABLET    Take 1 tablet (100 mcg total) by mouth once daily.    LIOTHYRONINE (CYTOMEL) 5 MCG TAB    Take 1 tablet (5 mcg total) by mouth once daily.    METHOCARBAMOL (ROBAXIN) 750 MG TAB    Take 750 mg by mouth every evening.    TRAMADOL (ULTRAM) 50 MG TABLET    Take 50 mg by mouth every 6 (six) hours as needed.   Changed and/or Refilled Medications    Modified Medication Previous Medication    CITALOPRAM (CELEXA) 40 MG TABLET citalopram (CELEXA) 40 MG tablet       Take 1 tablet (40 mg total) by mouth once daily.    Take 1 tablet (40 mg total) by mouth once daily.   Discontinued Medications    KETOROLAC (TORADOL) 10 MG TABLET        LINACLOTIDE (LINZESS) 72 MCG CAP CAPSULE    Take 1 capsule (72 mcg total) by mouth once daily.            Disclaimer: This note may have been prepared using voice recognition software, it may have not been extensively proofed, as such there could be errors within the text such as sound alike errors.

## 2020-10-01 DIAGNOSIS — E89.0 POSTOPERATIVE HYPOTHYROIDISM: ICD-10-CM

## 2020-10-01 RX ORDER — METHOCARBAMOL 750 MG/1
750 TABLET, FILM COATED ORAL NIGHTLY PRN
Qty: 30 TABLET | Refills: 1 | Status: SHIPPED | OUTPATIENT
Start: 2020-10-01 | End: 2020-10-05 | Stop reason: SDUPTHER

## 2020-10-01 RX ORDER — LEVOTHYROXINE SODIUM 100 UG/1
100 TABLET ORAL DAILY
Qty: 30 TABLET | Refills: 3 | Status: SHIPPED | OUTPATIENT
Start: 2020-10-01 | End: 2021-01-28 | Stop reason: SDUPTHER

## 2020-10-01 RX ORDER — LIOTHYRONINE SODIUM 5 UG/1
5 TABLET ORAL DAILY
Qty: 30 TABLET | Refills: 3 | Status: SHIPPED | OUTPATIENT
Start: 2020-10-01 | End: 2021-01-28 | Stop reason: SDUPTHER

## 2021-01-25 ENCOUNTER — OFFICE VISIT (OUTPATIENT)
Dept: FAMILY MEDICINE | Facility: CLINIC | Age: 31
End: 2021-01-25
Payer: MEDICAID

## 2021-01-25 VITALS
HEART RATE: 70 BPM | DIASTOLIC BLOOD PRESSURE: 72 MMHG | WEIGHT: 154 LBS | SYSTOLIC BLOOD PRESSURE: 102 MMHG | BODY MASS INDEX: 23.34 KG/M2 | OXYGEN SATURATION: 99 % | HEIGHT: 68 IN | TEMPERATURE: 98 F

## 2021-01-25 DIAGNOSIS — Z23 IMMUNIZATION DUE: ICD-10-CM

## 2021-01-25 DIAGNOSIS — F41.9 ANXIETY: ICD-10-CM

## 2021-01-25 DIAGNOSIS — E89.0 POSTOPERATIVE HYPOTHYROIDISM: Primary | ICD-10-CM

## 2021-01-25 DIAGNOSIS — Z79.899 ON LONG TERM DRUG THERAPY: ICD-10-CM

## 2021-01-25 PROCEDURE — 90686 IIV4 VACC NO PRSV 0.5 ML IM: CPT | Mod: S$GLB,,, | Performed by: FAMILY MEDICINE

## 2021-01-25 PROCEDURE — 99214 OFFICE O/P EST MOD 30 MIN: CPT | Mod: 25,S$GLB,, | Performed by: FAMILY MEDICINE

## 2021-01-25 PROCEDURE — 90471 FLU VACCINE (QUAD) GREATER THAN OR EQUAL TO 3YO PRESERVATIVE FREE IM: ICD-10-PCS | Mod: S$GLB,,, | Performed by: FAMILY MEDICINE

## 2021-01-25 PROCEDURE — 99214 PR OFFICE/OUTPT VISIT, EST, LEVL IV, 30-39 MIN: ICD-10-PCS | Mod: 25,S$GLB,, | Performed by: FAMILY MEDICINE

## 2021-01-25 PROCEDURE — 90686 FLU VACCINE (QUAD) GREATER THAN OR EQUAL TO 3YO PRESERVATIVE FREE IM: ICD-10-PCS | Mod: S$GLB,,, | Performed by: FAMILY MEDICINE

## 2021-01-25 PROCEDURE — 90471 IMMUNIZATION ADMIN: CPT | Mod: S$GLB,,, | Performed by: FAMILY MEDICINE

## 2021-01-25 RX ORDER — BUSPIRONE HYDROCHLORIDE 7.5 MG/1
7.5 TABLET ORAL 2 TIMES DAILY
Qty: 60 TABLET | Refills: 2 | Status: SHIPPED | OUTPATIENT
Start: 2021-01-25 | End: 2021-02-22

## 2021-01-26 LAB
ABS NRBC COUNT: 0 X 10 3/UL (ref 0–0.01)
ABSOLUTE BASOPHIL: 0.01 X 10 3/UL (ref 0–0.22)
ABSOLUTE EOSINOPHIL: 0.04 X 10 3/UL (ref 0.04–0.54)
ABSOLUTE IMMATURE GRAN: 0.01 X 10 3/UL (ref 0–0.04)
ABSOLUTE LYMPHOCYTE: 1.05 X 10 3/UL (ref 0.86–4.75)
ABSOLUTE MONOCYTE: 0.37 X 10 3/UL (ref 0.22–1.08)
ALBUMIN SERPL-MCNC: 4.4 G/DL (ref 3.5–5.2)
ALBUMIN/GLOB SERPL ELPH: 1.9 {RATIO} (ref 1–2.7)
ALP ISOS SERPL LEV INH-CCNC: 78 U/L (ref 35–105)
ALT (SGPT): 11 U/L (ref 0–33)
ANION GAP SERPL CALC-SCNC: 7 MMOL/L (ref 8–17)
AST SERPL-CCNC: 13 U/L (ref 0–32)
BASOPHILS NFR BLD: 0.3 % (ref 0.2–1.2)
BILIRUBIN, TOTAL: 0.29 MG/DL (ref 0–1.2)
BUN/CREAT SERPL: 17.3 (ref 6–20)
CALCIUM SERPL-MCNC: 9.3 MG/DL (ref 8.6–10.2)
CARBON DIOXIDE, CO2: 30 MMOL/L (ref 22–29)
CHLORIDE: 104 MMOL/L (ref 98–107)
CHOLEST SERPL-MSCNC: 159 MG/DL (ref 100–200)
CREAT SERPL-MCNC: 0.6 MG/DL (ref 0.5–0.9)
EOSINOPHIL NFR BLD: 1.1 % (ref 0.7–7)
GFR ESTIMATION: 116.6
GLOBULIN: 2.3 G/DL (ref 1.5–4.5)
GLUCOSE: 88 MG/DL (ref 74–106)
HCT VFR BLD AUTO: 37.6 % (ref 37–47)
HDLC SERPL-MCNC: 69 MG/DL
HGB BLD-MCNC: 11.7 G/DL (ref 12–16)
IMMATURE GRANULOCYTES: 0.3 % (ref 0–0.5)
LDL/HDL RATIO: 1.2 (ref 1–3)
LDLC SERPL CALC-MCNC: 80.8 MG/DL (ref 0–100)
LYMPHOCYTES NFR BLD: 28.1 % (ref 19.3–53.1)
MCH RBC QN AUTO: 29.5 PG (ref 27–32)
MCHC RBC AUTO-ENTMCNC: 31.1 G/DL (ref 32–36)
MCV RBC AUTO: 94.9 FL (ref 82–100)
MONOCYTES NFR BLD: 9.9 % (ref 4.7–12.5)
NEUTROPHILS # BLD AUTO: 2.26 X 10 3/UL (ref 2.15–7.56)
NEUTROPHILS NFR BLD: 60.3 %
NUCLEATED RED BLOOD CELLS: 0 /100 WBC (ref 0–0.2)
PLATELET # BLD AUTO: 225 X 10 3/UL (ref 135–400)
POTASSIUM: 4.3 MMOL/L (ref 3.5–5.1)
PROT SNV-MCNC: 6.7 G/DL (ref 6.4–8.3)
RBC # BLD AUTO: 3.96 X 10 6/UL (ref 4.2–5.4)
RDW-SD: 43.4 FL (ref 37–54)
SODIUM: 141 MMOL/L (ref 136–145)
T3FREE SERPL DIALY-MCNC: 3.23 PG/ML (ref 2–4.4)
T4, FREE: 1.19 NG/DL (ref 0.93–1.7)
TRIGL SERPL-MCNC: 46 MG/DL (ref 0–150)
TSH SERPL DL<=0.005 MIU/L-ACNC: 2.61 UIU/ML (ref 0.27–4.2)
UREA NITROGEN (BUN): 10.4 MG/DL (ref 6–20)
WBC # BLD: 3.74 X 10 3/UL (ref 4.3–10.8)

## 2021-01-28 DIAGNOSIS — E89.0 POSTOPERATIVE HYPOTHYROIDISM: ICD-10-CM

## 2021-01-28 RX ORDER — LEVOTHYROXINE SODIUM 100 UG/1
100 TABLET ORAL DAILY
Qty: 30 TABLET | Refills: 3 | Status: SHIPPED | OUTPATIENT
Start: 2021-01-28 | End: 2021-02-22 | Stop reason: SDUPTHER

## 2021-01-28 RX ORDER — LIOTHYRONINE SODIUM 5 UG/1
5 TABLET ORAL DAILY
Qty: 30 TABLET | Refills: 3 | Status: SHIPPED | OUTPATIENT
Start: 2021-01-28 | End: 2021-02-22 | Stop reason: SDUPTHER

## 2021-02-22 ENCOUNTER — OFFICE VISIT (OUTPATIENT)
Dept: FAMILY MEDICINE | Facility: CLINIC | Age: 31
End: 2021-02-22
Payer: MEDICAID

## 2021-02-22 VITALS
DIASTOLIC BLOOD PRESSURE: 70 MMHG | SYSTOLIC BLOOD PRESSURE: 100 MMHG | RESPIRATION RATE: 18 BRPM | HEIGHT: 68 IN | HEART RATE: 70 BPM | WEIGHT: 159 LBS | OXYGEN SATURATION: 99 % | TEMPERATURE: 99 F | BODY MASS INDEX: 24.1 KG/M2

## 2021-02-22 DIAGNOSIS — E89.0 POSTOPERATIVE HYPOTHYROIDISM: ICD-10-CM

## 2021-02-22 DIAGNOSIS — F41.9 ANXIETY: ICD-10-CM

## 2021-02-22 PROCEDURE — 99213 OFFICE O/P EST LOW 20 MIN: CPT | Mod: S$GLB,,, | Performed by: FAMILY MEDICINE

## 2021-02-22 PROCEDURE — 99213 PR OFFICE/OUTPT VISIT, EST, LEVL III, 20-29 MIN: ICD-10-PCS | Mod: S$GLB,,, | Performed by: FAMILY MEDICINE

## 2021-02-22 RX ORDER — LIOTHYRONINE SODIUM 5 UG/1
5 TABLET ORAL DAILY
Qty: 30 TABLET | Refills: 3 | Status: SHIPPED | OUTPATIENT
Start: 2021-02-22 | End: 2021-07-09

## 2021-02-22 RX ORDER — LEVOTHYROXINE SODIUM 100 UG/1
100 TABLET ORAL DAILY
Qty: 30 TABLET | Refills: 3 | Status: SHIPPED | OUTPATIENT
Start: 2021-02-22 | End: 2021-07-09

## 2021-02-22 RX ORDER — CITALOPRAM 40 MG/1
40 TABLET, FILM COATED ORAL DAILY
Qty: 30 TABLET | Refills: 3 | Status: SHIPPED | OUTPATIENT
Start: 2021-02-22 | End: 2021-07-09

## 2021-03-05 ENCOUNTER — TELEPHONE (OUTPATIENT)
Dept: FAMILY MEDICINE | Facility: CLINIC | Age: 31
End: 2021-03-05

## 2021-04-19 ENCOUNTER — OFFICE VISIT (OUTPATIENT)
Dept: FAMILY MEDICINE | Facility: CLINIC | Age: 31
End: 2021-04-19
Payer: MEDICAID

## 2021-04-19 ENCOUNTER — TELEPHONE (OUTPATIENT)
Dept: FAMILY MEDICINE | Facility: CLINIC | Age: 31
End: 2021-04-19

## 2021-04-19 DIAGNOSIS — N30.90 CYSTITIS: Primary | ICD-10-CM

## 2021-04-19 PROCEDURE — 99213 PR OFFICE/OUTPT VISIT, EST, LEVL III, 20-29 MIN: ICD-10-PCS | Mod: 95,,, | Performed by: FAMILY MEDICINE

## 2021-04-19 PROCEDURE — 99213 OFFICE O/P EST LOW 20 MIN: CPT | Mod: 95,,, | Performed by: FAMILY MEDICINE

## 2021-04-19 RX ORDER — PHENAZOPYRIDINE HYDROCHLORIDE 100 MG/1
100 TABLET, FILM COATED ORAL 3 TIMES DAILY PRN
Qty: 6 TABLET | Refills: 0 | Status: SHIPPED | OUTPATIENT
Start: 2021-04-19 | End: 2021-04-29

## 2021-04-19 RX ORDER — NITROFURANTOIN 25; 75 MG/1; MG/1
100 CAPSULE ORAL 2 TIMES DAILY
Qty: 10 CAPSULE | Refills: 0 | Status: SHIPPED | OUTPATIENT
Start: 2021-04-19 | End: 2022-01-11

## 2021-05-12 ENCOUNTER — PATIENT MESSAGE (OUTPATIENT)
Dept: RESEARCH | Facility: HOSPITAL | Age: 31
End: 2021-05-12

## 2021-05-19 ENCOUNTER — OFFICE VISIT (OUTPATIENT)
Dept: FAMILY MEDICINE | Facility: CLINIC | Age: 31
End: 2021-05-19
Payer: MEDICAID

## 2021-05-19 VITALS
WEIGHT: 155 LBS | DIASTOLIC BLOOD PRESSURE: 70 MMHG | TEMPERATURE: 98 F | HEART RATE: 80 BPM | BODY MASS INDEX: 23.49 KG/M2 | HEIGHT: 68 IN | SYSTOLIC BLOOD PRESSURE: 112 MMHG | OXYGEN SATURATION: 98 %

## 2021-05-19 DIAGNOSIS — N30.90 CYSTITIS: ICD-10-CM

## 2021-05-19 DIAGNOSIS — N30.10 IC (INTERSTITIAL CYSTITIS): Primary | ICD-10-CM

## 2021-05-19 PROCEDURE — 99214 PR OFFICE/OUTPT VISIT, EST, LEVL IV, 30-39 MIN: ICD-10-PCS | Mod: 25,S$GLB,, | Performed by: FAMILY MEDICINE

## 2021-05-19 PROCEDURE — 81003 POCT URINALYSIS: ICD-10-PCS | Mod: QW,S$GLB,, | Performed by: FAMILY MEDICINE

## 2021-05-19 PROCEDURE — 99214 OFFICE O/P EST MOD 30 MIN: CPT | Mod: 25,S$GLB,, | Performed by: FAMILY MEDICINE

## 2021-05-19 PROCEDURE — 81003 URINALYSIS AUTO W/O SCOPE: CPT | Mod: QW,S$GLB,, | Performed by: FAMILY MEDICINE

## 2021-05-19 RX ORDER — AMITRIPTYLINE HYDROCHLORIDE 10 MG/1
10 TABLET, FILM COATED ORAL NIGHTLY
Qty: 30 TABLET | Refills: 1 | Status: SHIPPED | OUTPATIENT
Start: 2021-05-19 | End: 2021-07-09 | Stop reason: SDUPTHER

## 2021-05-20 LAB
BILIRUB SERPL-MCNC: NEGATIVE MG/DL
BLOOD, POC UA: NORMAL
GLUCOSE UR QL STRIP: NEGATIVE
KETONES UR QL STRIP: NEGATIVE
LEUKOCYTE ESTERASE URINE, POC: NORMAL
NITRITE, POC UA: NEGATIVE
PH, POC UA: 5
PROTEIN, POC: NEGATIVE
SPECIFIC GRAVITY, POC UA: 1
UROBILINOGEN, POC UA: 0.2

## 2021-05-31 ENCOUNTER — PATIENT OUTREACH (OUTPATIENT)
Dept: ADMINISTRATIVE | Facility: HOSPITAL | Age: 31
End: 2021-05-31

## 2021-07-09 DIAGNOSIS — N30.10 IC (INTERSTITIAL CYSTITIS): ICD-10-CM

## 2021-07-09 RX ORDER — AMITRIPTYLINE HYDROCHLORIDE 10 MG/1
10 TABLET, FILM COATED ORAL NIGHTLY
Qty: 30 TABLET | Refills: 1 | Status: SHIPPED | OUTPATIENT
Start: 2021-07-09 | End: 2022-01-11

## 2021-08-09 ENCOUNTER — OFFICE VISIT (OUTPATIENT)
Dept: FAMILY MEDICINE | Facility: CLINIC | Age: 31
End: 2021-08-09
Payer: MEDICAID

## 2021-08-09 DIAGNOSIS — J06.9 UPPER RESPIRATORY TRACT INFECTION, UNSPECIFIED TYPE: ICD-10-CM

## 2021-08-09 DIAGNOSIS — U07.1 COVID-19: Primary | ICD-10-CM

## 2021-08-09 DIAGNOSIS — N30.10 IC (INTERSTITIAL CYSTITIS): ICD-10-CM

## 2021-08-09 DIAGNOSIS — U07.1 COVID-19 VIRUS DETECTED: ICD-10-CM

## 2021-08-09 LAB
CTP QC/QA: YES
SARS-COV-2 RDRP RESP QL NAA+PROBE: POSITIVE

## 2021-08-09 PROCEDURE — U0002: ICD-10-PCS | Mod: QW,,, | Performed by: FAMILY MEDICINE

## 2021-08-09 PROCEDURE — U0002 COVID-19 LAB TEST NON-CDC: HCPCS | Mod: QW,,, | Performed by: FAMILY MEDICINE

## 2021-08-09 RX ORDER — PROMETHAZINE HYDROCHLORIDE AND DEXTROMETHORPHAN HYDROBROMIDE 6.25; 15 MG/5ML; MG/5ML
5 SYRUP ORAL EVERY 6 HOURS PRN
Qty: 120 ML | Refills: 0 | Status: SHIPPED | OUTPATIENT
Start: 2021-08-09 | End: 2021-08-19

## 2021-10-07 DIAGNOSIS — E89.0 POSTOPERATIVE HYPOTHYROIDISM: ICD-10-CM

## 2021-10-07 RX ORDER — LIOTHYRONINE SODIUM 5 UG/1
5 TABLET ORAL DAILY
Qty: 30 TABLET | Refills: 2 | Status: SHIPPED | OUTPATIENT
Start: 2021-10-07 | End: 2022-01-13 | Stop reason: SDUPTHER

## 2021-10-07 RX ORDER — LEVOTHYROXINE SODIUM 100 UG/1
100 TABLET ORAL DAILY
Qty: 30 TABLET | Refills: 2 | Status: SHIPPED | OUTPATIENT
Start: 2021-10-07 | End: 2022-01-07 | Stop reason: SDUPTHER

## 2022-01-07 DIAGNOSIS — E89.0 POSTOPERATIVE HYPOTHYROIDISM: ICD-10-CM

## 2022-01-07 RX ORDER — LEVOTHYROXINE SODIUM 100 UG/1
100 TABLET ORAL DAILY
Qty: 30 TABLET | Refills: 2 | Status: SHIPPED | OUTPATIENT
Start: 2022-01-07 | End: 2022-01-13 | Stop reason: SDUPTHER

## 2022-01-07 NOTE — TELEPHONE ENCOUNTER
----- Message from Socorro Mcdaniels sent at 1/7/2022 11:28 AM CST -----  Contact: self  Patient called and asked if she could get her tyroid medication refilled? Patient stated she is out and her appt is not till the 20th. Patient stated the pharmacy already sent request. Thanks

## 2022-01-11 ENCOUNTER — OFFICE VISIT (OUTPATIENT)
Dept: FAMILY MEDICINE | Facility: CLINIC | Age: 32
End: 2022-01-11
Payer: MEDICAID

## 2022-01-11 VITALS
BODY MASS INDEX: 24 KG/M2 | OXYGEN SATURATION: 98 % | SYSTOLIC BLOOD PRESSURE: 110 MMHG | DIASTOLIC BLOOD PRESSURE: 72 MMHG | HEART RATE: 87 BPM | HEIGHT: 68 IN | TEMPERATURE: 98 F | WEIGHT: 158.38 LBS

## 2022-01-11 DIAGNOSIS — E89.0 POSTOPERATIVE HYPOTHYROIDISM: Primary | ICD-10-CM

## 2022-01-11 DIAGNOSIS — N30.10 IC (INTERSTITIAL CYSTITIS): ICD-10-CM

## 2022-01-11 DIAGNOSIS — Z79.899 ON LONG TERM DRUG THERAPY: ICD-10-CM

## 2022-01-11 DIAGNOSIS — F41.9 ANXIETY: ICD-10-CM

## 2022-01-11 PROCEDURE — 99214 PR OFFICE/OUTPT VISIT, EST, LEVL IV, 30-39 MIN: ICD-10-PCS | Mod: S$GLB,,, | Performed by: FAMILY MEDICINE

## 2022-01-11 PROCEDURE — 1159F PR MEDICATION LIST DOCUMENTED IN MEDICAL RECORD: ICD-10-PCS | Mod: CPTII,S$GLB,, | Performed by: FAMILY MEDICINE

## 2022-01-11 PROCEDURE — 99214 OFFICE O/P EST MOD 30 MIN: CPT | Mod: S$GLB,,, | Performed by: FAMILY MEDICINE

## 2022-01-11 PROCEDURE — 3008F PR BODY MASS INDEX (BMI) DOCUMENTED: ICD-10-PCS | Mod: CPTII,S$GLB,, | Performed by: FAMILY MEDICINE

## 2022-01-11 PROCEDURE — 3074F SYST BP LT 130 MM HG: CPT | Mod: CPTII,S$GLB,, | Performed by: FAMILY MEDICINE

## 2022-01-11 PROCEDURE — 3078F DIAST BP <80 MM HG: CPT | Mod: CPTII,S$GLB,, | Performed by: FAMILY MEDICINE

## 2022-01-11 PROCEDURE — 3074F PR MOST RECENT SYSTOLIC BLOOD PRESSURE < 130 MM HG: ICD-10-PCS | Mod: CPTII,S$GLB,, | Performed by: FAMILY MEDICINE

## 2022-01-11 PROCEDURE — 3078F PR MOST RECENT DIASTOLIC BLOOD PRESSURE < 80 MM HG: ICD-10-PCS | Mod: CPTII,S$GLB,, | Performed by: FAMILY MEDICINE

## 2022-01-11 PROCEDURE — 1159F MED LIST DOCD IN RCRD: CPT | Mod: CPTII,S$GLB,, | Performed by: FAMILY MEDICINE

## 2022-01-11 PROCEDURE — 3008F BODY MASS INDEX DOCD: CPT | Mod: CPTII,S$GLB,, | Performed by: FAMILY MEDICINE

## 2022-01-11 RX ORDER — CITALOPRAM 20 MG/1
20 TABLET, FILM COATED ORAL DAILY
Qty: 30 TABLET | Refills: 5 | Status: SHIPPED | OUTPATIENT
Start: 2022-01-11 | End: 2022-03-11 | Stop reason: SDUPTHER

## 2022-01-11 NOTE — PROGRESS NOTES
Subjective:      Patient ID: Kimber Herndon is a 31 y.o. female.    Chief Complaint: Follow-up      HPI:  31-year-old white female past history of thyroid cancer, depression, anxiety who presents for chronic medical management.  Needs refill of her thyroid meds.  She has been taking meds directed.  She ran out for Celexa few days ago.  She felt bad at 1st.  She is now starting to feel a bit better.  She does report some emotional Flattening while taking Celexa 40 mg.  She would like to decrease her dosage.  She had treatment for IC with urologist.  Reports her symptoms mostly improved.  It is worse with certain things like raw onions are citric acid.    Past Medical History:   Diagnosis Date    Anxiety     Cancer     thyroid     Depression     Hypothyroidism      Past Surgical History:   Procedure Laterality Date    MOUTH SURGERY      SPINE SURGERY  2020    T4-L3 PSF w/ SUBLAMINAR BANDS & SPOs for scoli correction, 2020, Dr. Jimmy REDDING.    TOTAL THYROIDECTOMY      TUBAL LIGATION      bilateral tubes removed      Family History   Problem Relation Age of Onset    Arthritis Mother     Autoimmune disease Mother     Lupus Mother     Hypothyroidism Mother     Hypertension Father     Alcohol abuse Father     Hypothyroidism Father     Hypothyroidism Sister     Fibromyalgia Son     Asthma Son     Stroke Maternal Grandfather     Heart disease Maternal Grandfather     Cancer Paternal Grandmother      Social History     Socioeconomic History    Marital status:    Tobacco Use    Smoking status: Former Smoker     Quit date:      Years since quittin.0    Smokeless tobacco: Never Used   Substance and Sexual Activity    Alcohol use: Not Currently     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    Drug use: Never     Review of patient's allergies indicates:   Allergen Reactions    Codeine Anaphylaxis       Review of Systems   Constitutional: Negative for activity change, appetite change, chills,  "fatigue and fever.   HENT: Negative for congestion, ear pain, postnasal drip, rhinorrhea, sinus pressure, sinus pain and sore throat.    Eyes: Negative for pain and redness.   Respiratory: Negative for cough, chest tightness and shortness of breath.    Cardiovascular: Negative for chest pain and leg swelling.   Gastrointestinal: Negative for abdominal distention, abdominal pain, constipation, diarrhea, nausea and vomiting.   Endocrine: Negative for cold intolerance and heat intolerance.   Genitourinary: Negative for dysuria, frequency and hematuria.   Musculoskeletal: Negative for arthralgias, back pain and joint swelling.   Skin: Negative for pallor.   Neurological: Negative for dizziness and light-headedness.   Psychiatric/Behavioral: Positive for dysphoric mood. Negative for agitation, decreased concentration and hallucinations. The patient is not nervous/anxious.        Objective:       /72 (BP Location: Right arm, Patient Position: Sitting)   Pulse 87   Temp 98.2 °F (36.8 °C) (Oral)   Ht 5' 8" (1.727 m)   Wt 71.8 kg (158 lb 6.4 oz)   SpO2 98%   BMI 24.08 kg/m²   Physical Exam  Constitutional:       Appearance: She is well-developed and well-nourished.   HENT:      Head: Normocephalic and atraumatic.      Nose: Nose normal.   Eyes:      Extraocular Movements: EOM normal.      Conjunctiva/sclera: Conjunctivae normal.      Pupils: Pupils are equal, round, and reactive to light.   Musculoskeletal:         General: Normal range of motion.      Cervical back: Normal range of motion and neck supple.   Skin:     General: Skin is warm and dry.   Neurological:      Mental Status: She is alert and oriented to person, place, and time.   Psychiatric:         Mood and Affect: Mood and affect normal.         Behavior: Behavior normal.         Thought Content: Thought content normal.         Assessment:     1. Postoperative hypothyroidism    2. On long term drug therapy    3. Anxiety    4. IC (interstitial " cystitis)        Plan:   Postoperative hypothyroidism  -     TSH; Future; Expected date: 01/11/2022  -     T4, Free; Future; Expected date: 01/11/2022  -     T3; Future; Expected date: 01/11/2022    On long term drug therapy  -     CBC Auto Differential; Future; Expected date: 01/11/2022  -     Comprehensive Metabolic Panel; Future; Expected date: 01/11/2022  -     Lipid Panel; Future; Expected date: 01/11/2022    Anxiety  -     citalopram (CELEXA) 20 MG tablet; Take 1 tablet (20 mg total) by mouth once daily.  Dispense: 30 tablet; Refill: 5    IC (interstitial cystitis)        Refill thyroid meds based on lab findings.    Trial of decreased Celexa.        Medication List with Changes/Refills   Current Medications    LIOTHYRONINE (CYTOMEL) 5 MCG TAB    Take 1 tablet (5 mcg total) by mouth once daily.    SYNTHROID 100 MCG TABLET    Take 1 tablet (100 mcg total) by mouth once daily.   Changed and/or Refilled Medications    Modified Medication Previous Medication    CITALOPRAM (CELEXA) 20 MG TABLET citalopram (CELEXA) 40 MG tablet       Take 1 tablet (20 mg total) by mouth once daily.    TAKE 1 TABLET BY MOUTH EVERY DAY   Discontinued Medications    AMITRIPTYLINE (ELAVIL) 10 MG TABLET    Take 1 tablet (10 mg total) by mouth every evening.    LIOTHYRONINE (CYTOMEL) 5 MCG TAB    TAKE 1 TABLET (5 MCG TOTAL) BY MOUTH ONCE DAILY.    MUPIROCIN (BACTROBAN) 2 % OINTMENT    Apply topically 3 (three) times daily.    NITROFURANTOIN, MACROCRYSTAL-MONOHYDRATE, (MACROBID) 100 MG CAPSULE    Take 1 capsule (100 mg total) by mouth 2 (two) times daily.    SYNTHROID 100 MCG TABLET    TAKE 1 TABLET BY MOUTH EVERY DAY            Disclaimer: This note may have been prepared using voice recognition software, it may have not been extensively proofed, as such there could be errors within the text such as sound alike errors.

## 2022-01-13 DIAGNOSIS — E89.0 POSTOPERATIVE HYPOTHYROIDISM: ICD-10-CM

## 2022-01-13 LAB
ABS NRBC COUNT: 0 X 10 3/UL (ref 0–0.01)
ABSOLUTE BASOPHIL: 0.01 X 10 3/UL (ref 0–0.22)
ABSOLUTE EOSINOPHIL: 0.05 X 10 3/UL (ref 0.04–0.54)
ABSOLUTE IMMATURE GRAN: 0.01 X 10 3/UL (ref 0–0.04)
ABSOLUTE LYMPHOCYTE: 1.31 X 10 3/UL (ref 0.86–4.75)
ABSOLUTE MONOCYTE: 0.32 X 10 3/UL (ref 0.22–1.08)
ALBUMIN SERPL-MCNC: 4.8 G/DL (ref 3.5–5.2)
ALBUMIN/GLOB SERPL ELPH: 2.5 {RATIO} (ref 1–2.7)
ALP ISOS SERPL LEV INH-CCNC: 62 U/L (ref 35–105)
ALT (SGPT): 10 U/L (ref 0–33)
ANION GAP SERPL CALC-SCNC: 7 MMOL/L (ref 8–17)
AST SERPL-CCNC: 16 U/L (ref 0–32)
BASOPHILS NFR BLD: 0.2 % (ref 0.2–1.2)
BILIRUBIN, TOTAL: 0.21 MG/DL (ref 0–1.2)
BUN/CREAT SERPL: 20.3 (ref 6–20)
CALCIUM SERPL-MCNC: 9.2 MG/DL (ref 8.6–10.2)
CARBON DIOXIDE, CO2: 28 MMOL/L (ref 22–29)
CHLORIDE: 102 MMOL/L (ref 98–107)
CHOLEST SERPL-MSCNC: 178 MG/DL (ref 100–200)
CREAT SERPL-MCNC: 0.58 MG/DL (ref 0.5–0.9)
EOSINOPHIL NFR BLD: 1.1 % (ref 0.7–7)
GFR ESTIMATION: 120.48
GLOBULIN: 1.9 G/DL (ref 1.5–4.5)
GLUCOSE: 92 MG/DL (ref 74–106)
HCT VFR BLD AUTO: 38 % (ref 37–47)
HDLC SERPL-MCNC: 66 MG/DL
HGB BLD-MCNC: 12.3 G/DL (ref 12–16)
IMMATURE GRANULOCYTES: 0.2 % (ref 0–0.5)
LDL/HDL RATIO: 1.5 (ref 1–3)
LDLC SERPL CALC-MCNC: 99.8 MG/DL (ref 0–100)
LYMPHOCYTES NFR BLD: 27.6 % (ref 19.3–53.1)
MCH RBC QN AUTO: 29.8 PG (ref 27–32)
MCHC RBC AUTO-ENTMCNC: 32.4 G/DL (ref 32–36)
MCV RBC AUTO: 92 FL (ref 82–100)
MONOCYTES NFR BLD: 6.7 % (ref 4.7–12.5)
NEUTROPHILS # BLD AUTO: 3.05 X 10 3/UL (ref 2.15–7.56)
NEUTROPHILS NFR BLD: 64.2 % (ref 34–71.1)
NUCLEATED RED BLOOD CELLS: 0 /100 WBC (ref 0–0.2)
PLATELET # BLD AUTO: 216 X 10 3/UL (ref 135–400)
POTASSIUM: ABNORMAL
PROT SNV-MCNC: 6.7 G/DL (ref 6.4–8.3)
RBC # BLD AUTO: 4.13 X 10 6/UL (ref 4.2–5.4)
RDW-SD: 38.3 FL (ref 37–54)
SODIUM: 137 MMOL/L (ref 136–145)
T3 SERPL-MCNC: 0.85 NG/ML (ref 0.8–2)
T4, FREE: 1.32 NG/DL (ref 0.93–1.7)
TRIGL SERPL-MCNC: 61 MG/DL (ref 0–150)
TSH SERPL DL<=0.005 MIU/L-ACNC: 1.8 UIU/ML (ref 0.27–4.2)
UREA NITROGEN (BUN): 11.8 MG/DL (ref 6–20)
WBC # BLD: 4.75 X 10 3/UL (ref 4.3–10.8)

## 2022-01-13 RX ORDER — LEVOTHYROXINE SODIUM 100 UG/1
100 TABLET ORAL DAILY
Qty: 30 TABLET | Refills: 5 | Status: SHIPPED | OUTPATIENT
Start: 2022-01-13 | End: 2022-05-09

## 2022-01-13 RX ORDER — LIOTHYRONINE SODIUM 5 UG/1
5 TABLET ORAL DAILY
Qty: 30 TABLET | Refills: 5 | Status: SHIPPED | OUTPATIENT
Start: 2022-01-13 | End: 2022-08-10 | Stop reason: SDUPTHER

## 2022-03-11 ENCOUNTER — TELEPHONE (OUTPATIENT)
Dept: FAMILY MEDICINE | Facility: CLINIC | Age: 32
End: 2022-03-11
Payer: MEDICAID

## 2022-03-11 DIAGNOSIS — F41.9 ANXIETY: ICD-10-CM

## 2022-03-11 RX ORDER — CITALOPRAM 40 MG/1
40 TABLET, FILM COATED ORAL DAILY
Qty: 30 TABLET | Refills: 5 | Status: SHIPPED | OUTPATIENT
Start: 2022-03-11 | End: 2022-08-10 | Stop reason: SDUPTHER

## 2022-05-12 ENCOUNTER — TELEPHONE (OUTPATIENT)
Dept: OBSTETRICS AND GYNECOLOGY | Facility: CLINIC | Age: 32
End: 2022-05-12
Payer: MEDICAID

## 2022-05-13 ENCOUNTER — OFFICE VISIT (OUTPATIENT)
Dept: OBSTETRICS AND GYNECOLOGY | Facility: CLINIC | Age: 32
End: 2022-05-13
Payer: MEDICAID

## 2022-05-13 VITALS
WEIGHT: 161 LBS | DIASTOLIC BLOOD PRESSURE: 64 MMHG | BODY MASS INDEX: 24.48 KG/M2 | HEART RATE: 93 BPM | SYSTOLIC BLOOD PRESSURE: 100 MMHG

## 2022-05-13 DIAGNOSIS — Z01.419 WELL WOMAN EXAM WITH ROUTINE GYNECOLOGICAL EXAM: Primary | ICD-10-CM

## 2022-05-13 DIAGNOSIS — N64.4 BREAST PAIN: ICD-10-CM

## 2022-05-13 PROCEDURE — 3074F PR MOST RECENT SYSTOLIC BLOOD PRESSURE < 130 MM HG: ICD-10-PCS | Mod: CPTII,S$GLB,, | Performed by: OBSTETRICS & GYNECOLOGY

## 2022-05-13 PROCEDURE — 3008F BODY MASS INDEX DOCD: CPT | Mod: CPTII,S$GLB,, | Performed by: OBSTETRICS & GYNECOLOGY

## 2022-05-13 PROCEDURE — 3078F PR MOST RECENT DIASTOLIC BLOOD PRESSURE < 80 MM HG: ICD-10-PCS | Mod: CPTII,S$GLB,, | Performed by: OBSTETRICS & GYNECOLOGY

## 2022-05-13 PROCEDURE — 3008F PR BODY MASS INDEX (BMI) DOCUMENTED: ICD-10-PCS | Mod: CPTII,S$GLB,, | Performed by: OBSTETRICS & GYNECOLOGY

## 2022-05-13 PROCEDURE — 1159F PR MEDICATION LIST DOCUMENTED IN MEDICAL RECORD: ICD-10-PCS | Mod: CPTII,S$GLB,, | Performed by: OBSTETRICS & GYNECOLOGY

## 2022-05-13 PROCEDURE — 99395 PR PREVENTIVE VISIT,EST,18-39: ICD-10-PCS | Mod: S$GLB,,, | Performed by: OBSTETRICS & GYNECOLOGY

## 2022-05-13 PROCEDURE — 3074F SYST BP LT 130 MM HG: CPT | Mod: CPTII,S$GLB,, | Performed by: OBSTETRICS & GYNECOLOGY

## 2022-05-13 PROCEDURE — 1159F MED LIST DOCD IN RCRD: CPT | Mod: CPTII,S$GLB,, | Performed by: OBSTETRICS & GYNECOLOGY

## 2022-05-13 PROCEDURE — 99395 PREV VISIT EST AGE 18-39: CPT | Mod: S$GLB,,, | Performed by: OBSTETRICS & GYNECOLOGY

## 2022-05-13 PROCEDURE — 3078F DIAST BP <80 MM HG: CPT | Mod: CPTII,S$GLB,, | Performed by: OBSTETRICS & GYNECOLOGY

## 2022-05-13 NOTE — PROGRESS NOTES
Subjective:       Patient ID: Kimber Herndon is a 31 y.o. female.    Chief Complaint:  Well Woman      History of Present Illness  Pt here for gyn annual.  History and past labs reviewed with patient.    Complaints heavy cycles since tubal ligation. Has had IUD and ocps in the past. Did not have improvement. Also reports right breast pain under her nipple       Review of Systems  Review of Systems   Constitutional: Negative for chills and fever.   Respiratory: Negative for shortness of breath.    Cardiovascular: Negative for chest pain.   Gastrointestinal: Negative for abdominal pain, blood in stool, constipation, diarrhea, nausea, vomiting and reflux.   Genitourinary: Positive for menorrhagia. Negative for dysmenorrhea, dyspareunia, dysuria, hematuria, hot flashes, menstrual problem, pelvic pain, vaginal bleeding, vaginal discharge, postcoital bleeding and vaginal dryness.   Musculoskeletal: Negative for arthralgias and joint swelling.   Integumentary:  Negative for rash, hair changes, breast mass, nipple discharge and breast skin changes.   Psychiatric/Behavioral: Negative for depression. The patient is not nervous/anxious.    Breast: Negative for asymmetry, lump, mass, nipple discharge and skin changes          Objective:     Vitals:    05/13/22 0835   BP: 100/64   Pulse: 93   Weight: 73 kg (161 lb)       Physical Exam:   Constitutional: She appears well-developed and well-nourished. No distress.    HENT:   Head: Normocephalic and atraumatic.    Eyes: Conjunctivae and EOM are normal.    Neck: No tracheal deviation present. No thyromegaly present.    Cardiovascular: Exam reveals no clubbing, no cyanosis and no edema.     Pulmonary/Chest: Effort normal. No respiratory distress.        Abdominal: Soft. She exhibits no distension and no mass. There is no abdominal tenderness. There is no rebound and no guarding. No hernia.     Genitourinary:    Vagina, uterus and rectum normal.      Pelvic exam was  performed with patient supine.   There is no rash, tenderness, lesion or injury on the right labia. There is no rash, tenderness, lesion or injury on the left labia. Cervix is normal. Right adnexum displays no mass, no tenderness and no fullness. Left adnexum displays no mass, no tenderness and no fullness.                Skin: She is not diaphoretic. No cyanosis. Nails show no clubbing.        breast exam right breast with tender nodule under nipple- no nipple dc, skin changes or lymph nodes palpated bilaterally    Assessment:        1. Well woman exam with routine gynecological exam                Plan:      Annual   Pap today   STD panel   Contraception - tubal ligation   Discussed ablation for heavy cycles. Pt to schedule   RTC  For preop

## 2022-05-17 ENCOUNTER — OFFICE VISIT (OUTPATIENT)
Dept: FAMILY MEDICINE | Facility: CLINIC | Age: 32
End: 2022-05-17
Payer: MEDICAID

## 2022-05-17 VITALS
HEART RATE: 80 BPM | WEIGHT: 161 LBS | HEIGHT: 68 IN | BODY MASS INDEX: 24.4 KG/M2 | RESPIRATION RATE: 18 BRPM | DIASTOLIC BLOOD PRESSURE: 74 MMHG | SYSTOLIC BLOOD PRESSURE: 120 MMHG | TEMPERATURE: 99 F | OXYGEN SATURATION: 98 %

## 2022-05-17 DIAGNOSIS — E89.0 POSTOPERATIVE HYPOTHYROIDISM: Primary | ICD-10-CM

## 2022-05-17 DIAGNOSIS — N92.0 MENORRHAGIA WITH REGULAR CYCLE: Primary | ICD-10-CM

## 2022-05-17 DIAGNOSIS — N92.0 MENORRHAGIA: Primary | ICD-10-CM

## 2022-05-17 PROCEDURE — 3008F PR BODY MASS INDEX (BMI) DOCUMENTED: ICD-10-PCS | Mod: CPTII,S$GLB,, | Performed by: FAMILY MEDICINE

## 2022-05-17 PROCEDURE — 3078F PR MOST RECENT DIASTOLIC BLOOD PRESSURE < 80 MM HG: ICD-10-PCS | Mod: CPTII,S$GLB,, | Performed by: FAMILY MEDICINE

## 2022-05-17 PROCEDURE — 3074F SYST BP LT 130 MM HG: CPT | Mod: CPTII,S$GLB,, | Performed by: FAMILY MEDICINE

## 2022-05-17 PROCEDURE — 1159F MED LIST DOCD IN RCRD: CPT | Mod: CPTII,S$GLB,, | Performed by: FAMILY MEDICINE

## 2022-05-17 PROCEDURE — 3008F BODY MASS INDEX DOCD: CPT | Mod: CPTII,S$GLB,, | Performed by: FAMILY MEDICINE

## 2022-05-17 PROCEDURE — 3078F DIAST BP <80 MM HG: CPT | Mod: CPTII,S$GLB,, | Performed by: FAMILY MEDICINE

## 2022-05-17 PROCEDURE — 99213 PR OFFICE/OUTPT VISIT, EST, LEVL III, 20-29 MIN: ICD-10-PCS | Mod: S$GLB,,, | Performed by: FAMILY MEDICINE

## 2022-05-17 PROCEDURE — 99213 OFFICE O/P EST LOW 20 MIN: CPT | Mod: S$GLB,,, | Performed by: FAMILY MEDICINE

## 2022-05-17 PROCEDURE — 1159F PR MEDICATION LIST DOCUMENTED IN MEDICAL RECORD: ICD-10-PCS | Mod: CPTII,S$GLB,, | Performed by: FAMILY MEDICINE

## 2022-05-17 PROCEDURE — 3074F PR MOST RECENT SYSTOLIC BLOOD PRESSURE < 130 MM HG: ICD-10-PCS | Mod: CPTII,S$GLB,, | Performed by: FAMILY MEDICINE

## 2022-05-17 NOTE — PROGRESS NOTES
Subjective:      Patient ID: Kimber Herndon is a 31 y.o. female.    Chief Complaint: Follow-up (Increase thyroid medication/fatigue)      HPI:  31-year-old white female past history of depression, hypothyroidism and thyroid cancer who presents for fatigue.  Has noticed some decreased hair growth.  Worried this could be her thyroid.  Does feel like she gets what she calls thyroid headaches.  Has gained some weight.    Past Medical History:   Diagnosis Date    Anxiety     Cancer     thyroid     Depression     Hypothyroidism      Past Surgical History:   Procedure Laterality Date    MOUTH SURGERY      SPINE SURGERY  2020    T4-L3 PSF w/ SUBLAMINAR BANDS & SPOs for scoli correction, 2020, [facility]. Jimmy LOWE.    TOTAL THYROIDECTOMY      TUBAL LIGATION      bilateral tubes removed      Family History   Problem Relation Age of Onset    Arthritis Mother     Autoimmune disease Mother     Lupus Mother     Hypothyroidism Mother     Hypertension Father     Alcohol abuse Father     Hypothyroidism Father     Hypothyroidism Sister     Fibromyalgia Son     Asthma Son     Stroke Maternal Grandfather     Heart disease Maternal Grandfather     Cancer Paternal Grandmother      Social History     Socioeconomic History    Marital status:    Tobacco Use    Smoking status: Former Smoker     Quit date:      Years since quittin.3    Smokeless tobacco: Never Used   Substance and Sexual Activity    Alcohol use: Not Currently     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 Negative for activity change, appetite change, chills and fever.   HENT: Negative for congestion, ear pain, postnasal drip, rhinorrhea, sinus pressure, sinus pain and sore throat.    Eyes: Negative for pain and redness.   Respiratory: Negative for cough, chest tightness and  "shortness of breath.    Cardiovascular: Negative for chest pain and leg swelling.   Gastrointestinal: Negative for abdominal distention, abdominal pain, constipation, diarrhea, nausea and vomiting.   Endocrine: Negative for cold intolerance and heat intolerance.   Genitourinary: Negative for dysuria, frequency and hematuria.   Musculoskeletal: Negative for arthralgias, back pain and joint swelling.   Skin: Negative for pallor.   Neurological: Negative for dizziness and light-headedness.   Psychiatric/Behavioral: Negative for agitation, decreased concentration and hallucinations. The patient is not nervous/anxious.        Objective:       /74 (BP Location: Left arm, Patient Position: Sitting, BP Method: Medium (Manual))   Pulse 80   Temp 98.6 °F (37 °C) (Oral)   Resp 18   Ht 5' 8" (1.727 m)   Wt 73 kg (161 lb)   SpO2 98%   BMI 24.48 kg/m²   Physical Exam  Constitutional:       Appearance: She is well-developed.   HENT:      Head: Normocephalic and atraumatic.   Eyes:      Conjunctiva/sclera: Conjunctivae normal.   Pulmonary:      Effort: Pulmonary effort is normal.   Neurological:      Mental Status: She is alert and oriented to person, place, and time.   Psychiatric:         Behavior: Behavior normal.         Thought Content: Thought content normal.         Judgment: Judgment normal.         Assessment:     1. Postoperative hypothyroidism        Plan:   Postoperative hypothyroidism  -     T3; Future; Expected date: 05/17/2022  -     TSH and Free T4; Future; Expected date: 05/17/2022      Labs pending.  Adjust thyroid meds based on lab findings    Medication List with Changes/Refills   Current Medications    CITALOPRAM (CELEXA) 40 MG TABLET    Take 1 tablet (40 mg total) by mouth once daily.    LIOTHYRONINE (CYTOMEL) 5 MCG TAB    Take 1 tablet (5 mcg total) by mouth once daily.    SYNTHROID 100 MCG TABLET    TAKE 1 TABLET (100 MCG TOTAL) BY MOUTH ONCE DAILY.            Disclaimer: This note may have been " prepared using voice recognition software, it may have not been extensively proofed, as such there could be errors within the text such as sound alike errors.

## 2022-05-18 ENCOUNTER — PROCEDURE VISIT (OUTPATIENT)
Dept: OBSTETRICS AND GYNECOLOGY | Facility: CLINIC | Age: 32
End: 2022-05-18
Payer: MEDICAID

## 2022-05-18 DIAGNOSIS — N92.0 MENORRHAGIA WITH REGULAR CYCLE: ICD-10-CM

## 2022-05-18 DIAGNOSIS — N64.4 BREAST PAIN: Primary | ICD-10-CM

## 2022-05-19 LAB
T3 SERPL-MCNC: 1.4 NG/ML (ref 0.8–2)
T4, FREE: 1.33 NG/DL (ref 0.93–1.7)
TSH SERPL DL<=0.005 MIU/L-ACNC: 0.14 UIU/ML (ref 0.27–4.2)

## 2022-05-24 ENCOUNTER — OFFICE VISIT (OUTPATIENT)
Dept: OBSTETRICS AND GYNECOLOGY | Facility: CLINIC | Age: 32
End: 2022-05-24
Payer: MEDICAID

## 2022-05-24 VITALS
HEART RATE: 81 BPM | SYSTOLIC BLOOD PRESSURE: 100 MMHG | DIASTOLIC BLOOD PRESSURE: 72 MMHG | BODY MASS INDEX: 24.78 KG/M2 | WEIGHT: 163 LBS

## 2022-05-24 DIAGNOSIS — N92.0 MENORRHAGIA WITH REGULAR CYCLE: Primary | ICD-10-CM

## 2022-05-24 LAB
ANION GAP SERPL CALC-SCNC: 5 MMOL/L (ref 3–11)
APPEARANCE, UA: CLEAR
B-HCG UR QL: NEGATIVE
BANDS: 1 % (ref 0–5)
BILIRUB UR QL STRIP: NEGATIVE MG/DL
BUN SERPL-MCNC: 12 MG/DL (ref 7–18)
BUN/CREAT SERPL: 21.05 RATIO (ref 7–18)
CALCIUM SERPL-MCNC: 8.6 MG/DL (ref 8.8–10.5)
CELLS COUNTED: 100
CHLORIDE SERPL-SCNC: 106 MMOL/L (ref 100–108)
CO2 SERPL-SCNC: 31 MMOL/L (ref 21–32)
COLOR UR: NORMAL
CREAT SERPL-MCNC: 0.57 MG/DL (ref 0.55–1.02)
EOSINOPHIL NFR BLD: 3 % (ref 1–3)
ERYTHROCYTE [DISTWIDTH] IN BLOOD BY AUTOMATED COUNT: 11.5 % (ref 12.5–18)
GFR ESTIMATION: > 60
GLUCOSE (UA): NORMAL MG/DL
GLUCOSE SERPL-MCNC: 89 MG/DL (ref 70–110)
HCT VFR BLD AUTO: 38.3 % (ref 37–47)
HGB BLD-MCNC: 12.2 G/DL (ref 12–16)
HGB UR QL STRIP: NEGATIVE /UL
KETONES UR QL STRIP: NEGATIVE MG/DL
LEUKOCYTE ESTERASE UR QL STRIP: NEGATIVE /UL
LYMPHOCYTES NFR BLD: 38 % (ref 25–40)
MCH RBC QN AUTO: 29.6 PG (ref 27–31.2)
MCHC RBC AUTO-ENTMCNC: 31.9 G/DL (ref 31.8–35.4)
MCV RBC AUTO: 93 FL (ref 80–97)
MONOCYTES NFR BLD: 14 % (ref 1–15)
NEUTROPHILS # BLD AUTO: 1.4 10*3/UL (ref 1.8–7.7)
NEUTROPHILS NFR BLD: 44 % (ref 37–80)
NITRITE UR QL STRIP: NEGATIVE
NUCLEATED RED BLOOD CELLS: 0 %
PH UR STRIP: 6 PH (ref 5–9)
PLATELETS: 191 10*3/UL (ref 142–424)
POTASSIUM SERPL-SCNC: 4.1 MMOL/L (ref 3.6–5.2)
PROT UR QL STRIP: NEGATIVE MG/DL
RBC # BLD AUTO: 4.12 10*6/UL (ref 4.2–5.4)
RBC MORPH BLD: ABNORMAL
SMALL PLATELETS BLD QL SMEAR: ADEQUATE
SODIUM BLD-SCNC: 142 MMOL/L (ref 135–145)
SP GR UR STRIP: 1.01 (ref 1–1.03)
SPECIMEN COLLECTION METHOD, URINE: NORMAL
UROBILINOGEN UR STRIP-ACNC: NORMAL MG/DL
WBC # BLD: 3.2 10*3/UL (ref 4.6–10.2)

## 2022-05-24 PROCEDURE — 3078F PR MOST RECENT DIASTOLIC BLOOD PRESSURE < 80 MM HG: ICD-10-PCS | Mod: CPTII,S$GLB,, | Performed by: OBSTETRICS & GYNECOLOGY

## 2022-05-24 PROCEDURE — 1159F PR MEDICATION LIST DOCUMENTED IN MEDICAL RECORD: ICD-10-PCS | Mod: CPTII,S$GLB,, | Performed by: OBSTETRICS & GYNECOLOGY

## 2022-05-24 PROCEDURE — 99499 NO LOS: ICD-10-PCS | Mod: S$GLB,,, | Performed by: OBSTETRICS & GYNECOLOGY

## 2022-05-24 PROCEDURE — 3074F PR MOST RECENT SYSTOLIC BLOOD PRESSURE < 130 MM HG: ICD-10-PCS | Mod: CPTII,S$GLB,, | Performed by: OBSTETRICS & GYNECOLOGY

## 2022-05-24 PROCEDURE — 3074F SYST BP LT 130 MM HG: CPT | Mod: CPTII,S$GLB,, | Performed by: OBSTETRICS & GYNECOLOGY

## 2022-05-24 PROCEDURE — 3078F DIAST BP <80 MM HG: CPT | Mod: CPTII,S$GLB,, | Performed by: OBSTETRICS & GYNECOLOGY

## 2022-05-24 PROCEDURE — 3008F PR BODY MASS INDEX (BMI) DOCUMENTED: ICD-10-PCS | Mod: CPTII,S$GLB,, | Performed by: OBSTETRICS & GYNECOLOGY

## 2022-05-24 PROCEDURE — 1159F MED LIST DOCD IN RCRD: CPT | Mod: CPTII,S$GLB,, | Performed by: OBSTETRICS & GYNECOLOGY

## 2022-05-24 PROCEDURE — 3008F BODY MASS INDEX DOCD: CPT | Mod: CPTII,S$GLB,, | Performed by: OBSTETRICS & GYNECOLOGY

## 2022-05-24 PROCEDURE — 99499 UNLISTED E&M SERVICE: CPT | Mod: S$GLB,,, | Performed by: OBSTETRICS & GYNECOLOGY

## 2022-05-24 NOTE — PROGRESS NOTES
31 y.o. No obstetric history on file. presents for pre-op H&P for ablation.  No LMP recorded. (Menstrual status: Other)..    Past Medical History:   Diagnosis Date    Anxiety     Cancer     thyroid     Depression     Hypothyroidism      Past Surgical History:   Procedure Laterality Date    MOUTH SURGERY      SPINE SURGERY  2020    T4-L3 PSF w/ SUBLAMINAR BANDS & SPOs for scoli correction, 2020, Dr. Jimmy REDDING.    TOTAL THYROIDECTOMY      TUBAL LIGATION      bilateral tubes removed      Family History   Problem Relation Age of Onset    Arthritis Mother     Autoimmune disease Mother     Lupus Mother     Hypothyroidism Mother     Hypertension Father     Alcohol abuse Father     Hypothyroidism Father     Hypothyroidism Sister     Fibromyalgia Son     Asthma Son     Stroke Maternal Grandfather     Heart disease Maternal Grandfather     Cancer Paternal Grandmother      Review of patient's allergies indicates:   Allergen Reactions    Codeine Anaphylaxis       Current Outpatient Medications:     citalopram (CELEXA) 40 MG tablet, Take 1 tablet (40 mg total) by mouth once daily., Disp: 30 tablet, Rfl: 5    liothyronine (CYTOMEL) 5 MCG Tab, Take 1 tablet (5 mcg total) by mouth once daily., Disp: 30 tablet, Rfl: 5    SYNTHROID 100 mcg tablet, TAKE 1 TABLET (100 MCG TOTAL) BY MOUTH ONCE DAILY., Disp: 30 tablet, Rfl: 1  Social History     Socioeconomic History    Marital status:    Tobacco Use    Smoking status: Former Smoker     Quit date:      Years since quittin.3    Smokeless tobacco: Never Used   Substance and Sexual Activity    Alcohol use: Not Currently     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   Respiratory: Negative for shortness of breath.    Cardiovascular: Negative for chest pain.   Gastrointestinal: Negative for abdominal pain, blood in stool, constipation, diarrhea, nausea, vomiting and  reflux.   Genitourinary: Negative for dysmenorrhea, dyspareunia, dysuria, hematuria, hot flashes, menorrhagia, menstrual problem, pelvic pain, vaginal bleeding, vaginal discharge, postcoital bleeding and vaginal dryness.   Musculoskeletal: Negative for arthralgias and joint swelling.   Integumentary:  Negative for rash, hair changes, breast mass, nipple discharge and breast skin changes.   Psychiatric/Behavioral: Negative for depression. The patient is not nervous/anxious.    Breast: Negative for asymmetry, lump, mass, nipple discharge and skin changes      Vitals:    05/24/22 0931   BP: 100/72   Pulse: 81       Physical Exam:   Constitutional: She is oriented to person, place, and time. She appears well-developed and well-nourished.    HENT:   Head: Normocephalic and atraumatic.    Eyes: Conjunctivae are normal. No scleral icterus.    Neck: No thyromegaly present.    Cardiovascular: Normal rate, regular rhythm and normal heart sounds.  Exam reveals no clubbing, no cyanosis and no edema.     Pulmonary/Chest: Effort normal and breath sounds normal. No respiratory distress.        Abdominal: Soft. She exhibits no distension. No hernia.             Musculoskeletal: Normal range of motion and moves all extremeties.       Neurological: She is alert and oriented to person, place, and time.    Skin: Skin is warm and dry. No cyanosis. Nails show no clubbing.    Psychiatric: She has a normal mood and affect. Her behavior is normal.       Last pap neg   Last pathology n/a   Last ultrasound n/a     Assessment: menorrhagia     Plan: to OR for ablation d&C   I have discussed the risks, benefits, indications, and alternatives of the procedure in detail.  The patient verbalizes her understanding.  All questions answered.  Consents signed.  The patient agrees to proceed to proceed as planned.

## 2022-05-25 ENCOUNTER — OUTSIDE PLACE OF SERVICE (OUTPATIENT)
Dept: OBSTETRICS AND GYNECOLOGY | Facility: CLINIC | Age: 32
End: 2022-05-25
Payer: MEDICAID

## 2022-05-25 PROCEDURE — 58563 HYSTEROSCOPY ABLATION: CPT | Mod: ,,, | Performed by: OBSTETRICS & GYNECOLOGY

## 2022-05-25 PROCEDURE — 58563 PR HYSTEROSCOPY,W/ENDOMETRIAL ABLATION: ICD-10-PCS | Mod: ,,, | Performed by: OBSTETRICS & GYNECOLOGY

## 2022-05-27 LAB — SPECIMEN TO PATHOLOGY: NORMAL

## 2022-06-02 ENCOUNTER — PATIENT MESSAGE (OUTPATIENT)
Dept: FAMILY MEDICINE | Facility: CLINIC | Age: 32
End: 2022-06-02
Payer: MEDICAID

## 2022-06-10 ENCOUNTER — OFFICE VISIT (OUTPATIENT)
Dept: OBSTETRICS AND GYNECOLOGY | Facility: CLINIC | Age: 32
End: 2022-06-10
Payer: MEDICAID

## 2022-06-10 VITALS
DIASTOLIC BLOOD PRESSURE: 81 MMHG | WEIGHT: 161 LBS | HEART RATE: 89 BPM | SYSTOLIC BLOOD PRESSURE: 119 MMHG | BODY MASS INDEX: 24.48 KG/M2

## 2022-06-10 DIAGNOSIS — N92.0 MENORRHAGIA WITH REGULAR CYCLE: Primary | ICD-10-CM

## 2022-06-10 PROCEDURE — 3079F DIAST BP 80-89 MM HG: CPT | Mod: CPTII,S$GLB,, | Performed by: OBSTETRICS & GYNECOLOGY

## 2022-06-10 PROCEDURE — 99024 POSTOP FOLLOW-UP VISIT: CPT | Mod: S$GLB,,, | Performed by: OBSTETRICS & GYNECOLOGY

## 2022-06-10 PROCEDURE — 3074F SYST BP LT 130 MM HG: CPT | Mod: CPTII,S$GLB,, | Performed by: OBSTETRICS & GYNECOLOGY

## 2022-06-10 PROCEDURE — 3079F PR MOST RECENT DIASTOLIC BLOOD PRESSURE 80-89 MM HG: ICD-10-PCS | Mod: CPTII,S$GLB,, | Performed by: OBSTETRICS & GYNECOLOGY

## 2022-06-10 PROCEDURE — 3008F BODY MASS INDEX DOCD: CPT | Mod: CPTII,S$GLB,, | Performed by: OBSTETRICS & GYNECOLOGY

## 2022-06-10 PROCEDURE — 99024 PR POST-OP FOLLOW-UP VISIT: ICD-10-PCS | Mod: S$GLB,,, | Performed by: OBSTETRICS & GYNECOLOGY

## 2022-06-10 PROCEDURE — 3008F PR BODY MASS INDEX (BMI) DOCUMENTED: ICD-10-PCS | Mod: CPTII,S$GLB,, | Performed by: OBSTETRICS & GYNECOLOGY

## 2022-06-10 PROCEDURE — 3074F PR MOST RECENT SYSTOLIC BLOOD PRESSURE < 130 MM HG: ICD-10-PCS | Mod: CPTII,S$GLB,, | Performed by: OBSTETRICS & GYNECOLOGY

## 2022-06-10 NOTE — PROGRESS NOTES
Subjective:       Patient ID: Kimber Herndon is a 31 y.o. female.    Chief Complaint:  Post-op Evaluation        History of Present Illness  Here for 2 wk postop exam sp endometrial ablation   Complaints none  Review of Systems  Review of Systems   Constitutional: Negative.    Respiratory: Negative for shortness of breath.    Cardiovascular: Negative for chest pain.   Gastrointestinal: Negative for abdominal pain, constipation, diarrhea, nausea and vomiting.   Genitourinary: Negative for dysuria, hematuria, menstrual problem, pelvic pain, urgency and vaginal pain.   Neurological: Negative for headaches.   Psychiatric/Behavioral: Negative for depression and sleep disturbance. The patient is not nervous/anxious.            Objective:    Physical Exam:   Constitutional: She appears well-developed and well-nourished. No distress.    HENT:   Head: Normocephalic.    Eyes: Conjunctivae and EOM are normal.    Neck: No tracheal deviation present. No thyromegaly present.    Cardiovascular: Exam reveals no clubbing, no cyanosis and no edema.     Pulmonary/Chest: Effort normal. No respiratory distress.                  Musculoskeletal: Normal range of motion and moves all extremeties.        Skin: No rash noted. She is not diaphoretic. No cyanosis. Nails show no clubbing.    Psychiatric: She has a normal mood and affect. Her behavior is normal. Judgment and thought content normal.          Assessment:     Postop     Plan:   rtc for annual or prn  Preventative screening utd  Restrictions lifted

## 2022-07-13 ENCOUNTER — PATIENT MESSAGE (OUTPATIENT)
Dept: OBSTETRICS AND GYNECOLOGY | Facility: CLINIC | Age: 32
End: 2022-07-13
Payer: MEDICAID

## 2022-07-13 ENCOUNTER — TELEPHONE (OUTPATIENT)
Dept: OBSTETRICS AND GYNECOLOGY | Facility: CLINIC | Age: 32
End: 2022-07-13
Payer: MEDICAID

## 2022-07-13 DIAGNOSIS — N30.01 ACUTE CYSTITIS WITH HEMATURIA: Primary | ICD-10-CM

## 2022-07-13 RX ORDER — SULFAMETHOXAZOLE AND TRIMETHOPRIM 800; 160 MG/1; MG/1
1 TABLET ORAL 2 TIMES DAILY
Qty: 10 TABLET | Refills: 0 | Status: SHIPPED | OUTPATIENT
Start: 2022-07-13 | End: 2022-07-18

## 2022-07-13 NOTE — TELEPHONE ENCOUNTER
Pt states that she is urinating blood. I asked her to come to the office and leave us a urine sample so we can run it and send her out some antibiotics if she needs it. She verbalized understanding and will be her as soon as possible.

## 2022-07-13 NOTE — TELEPHONE ENCOUNTER
----- Message from Claudia Perez sent at 7/13/2022  8:23 AM CDT -----  Contact: Patient  Patient need a same day appointment    Patient is having vaginal bleeding.  Please call patient to schedule      Call back # [phone]

## 2022-08-10 ENCOUNTER — OFFICE VISIT (OUTPATIENT)
Dept: UROLOGY | Facility: CLINIC | Age: 32
End: 2022-08-10
Payer: MEDICAID

## 2022-08-10 VITALS
WEIGHT: 155 LBS | RESPIRATION RATE: 20 BRPM | SYSTOLIC BLOOD PRESSURE: 96 MMHG | DIASTOLIC BLOOD PRESSURE: 63 MMHG | HEART RATE: 101 BPM | TEMPERATURE: 98 F | HEIGHT: 71 IN | BODY MASS INDEX: 21.7 KG/M2

## 2022-08-10 DIAGNOSIS — R39.15 URINARY URGENCY: ICD-10-CM

## 2022-08-10 DIAGNOSIS — N30.10 INTERSTITIAL CYSTITIS: Primary | ICD-10-CM

## 2022-08-10 DIAGNOSIS — R35.0 URINARY FREQUENCY: ICD-10-CM

## 2022-08-10 DIAGNOSIS — N32.89 BLADDER SPASM: ICD-10-CM

## 2022-08-10 LAB — POC RESIDUAL URINE VOLUME: 0 ML (ref 0–100)

## 2022-08-10 PROCEDURE — 3078F PR MOST RECENT DIASTOLIC BLOOD PRESSURE < 80 MM HG: ICD-10-PCS | Mod: CPTII,S$GLB,, | Performed by: NURSE PRACTITIONER

## 2022-08-10 PROCEDURE — 99204 PR OFFICE/OUTPT VISIT, NEW, LEVL IV, 45-59 MIN: ICD-10-PCS | Mod: S$GLB,,, | Performed by: NURSE PRACTITIONER

## 2022-08-10 PROCEDURE — 51798 POCT BLADDER SCAN: ICD-10-PCS | Mod: S$GLB,,, | Performed by: NURSE PRACTITIONER

## 2022-08-10 PROCEDURE — 3008F PR BODY MASS INDEX (BMI) DOCUMENTED: ICD-10-PCS | Mod: CPTII,S$GLB,, | Performed by: NURSE PRACTITIONER

## 2022-08-10 PROCEDURE — 1160F RVW MEDS BY RX/DR IN RCRD: CPT | Mod: CPTII,S$GLB,, | Performed by: NURSE PRACTITIONER

## 2022-08-10 PROCEDURE — 51798 US URINE CAPACITY MEASURE: CPT | Mod: S$GLB,,, | Performed by: NURSE PRACTITIONER

## 2022-08-10 PROCEDURE — 99204 OFFICE O/P NEW MOD 45 MIN: CPT | Mod: S$GLB,,, | Performed by: NURSE PRACTITIONER

## 2022-08-10 PROCEDURE — 1159F MED LIST DOCD IN RCRD: CPT | Mod: CPTII,S$GLB,, | Performed by: NURSE PRACTITIONER

## 2022-08-10 PROCEDURE — 3074F PR MOST RECENT SYSTOLIC BLOOD PRESSURE < 130 MM HG: ICD-10-PCS | Mod: CPTII,S$GLB,, | Performed by: NURSE PRACTITIONER

## 2022-08-10 PROCEDURE — 3074F SYST BP LT 130 MM HG: CPT | Mod: CPTII,S$GLB,, | Performed by: NURSE PRACTITIONER

## 2022-08-10 PROCEDURE — 1160F PR REVIEW ALL MEDS BY PRESCRIBER/CLIN PHARMACIST DOCUMENTED: ICD-10-PCS | Mod: CPTII,S$GLB,, | Performed by: NURSE PRACTITIONER

## 2022-08-10 PROCEDURE — 1159F PR MEDICATION LIST DOCUMENTED IN MEDICAL RECORD: ICD-10-PCS | Mod: CPTII,S$GLB,, | Performed by: NURSE PRACTITIONER

## 2022-08-10 PROCEDURE — 3008F BODY MASS INDEX DOCD: CPT | Mod: CPTII,S$GLB,, | Performed by: NURSE PRACTITIONER

## 2022-08-10 PROCEDURE — 3078F DIAST BP <80 MM HG: CPT | Mod: CPTII,S$GLB,, | Performed by: NURSE PRACTITIONER

## 2022-08-10 RX ORDER — IBUPROFEN 800 MG/1
TABLET ORAL
COMMUNITY
Start: 2022-05-25

## 2022-08-10 NOTE — PROGRESS NOTES
Subjective:       Patient ID: Kimber Herndon is a 32 y.o. female.    Chief Complaint: Interstitial Cystitis, Urinary Urgency, Urinary Frequency, Urinary Tract Infection, Dysuria (Pressure ), Bladder Pain, Galactorrhea, and Hematuria      HPI: 32-year-old female, new to [facility], presents with history of interstitial cystitis.  Patient was being seen by Dr. Jacob.  She had DMSO treatments in the past.  She is currently follow an IC diet.  Patient states the DMSO treatments worked really well.  However, recently, over the last few months, she has been having some flare ups.  She complains of episodes of frequency and urgency.  She also have some leakage with sneezing or straining.  She states that leakage is minor.  She will have episodes of bladder spasms.  She has used AZO without any significant improvement.  With flare-up she will have blood in her urine.  She does notice worsening of symptoms of certain things see each in drinks, and with stressors.    This time she denies any odor urine.  Denies any fever.  Denies any body aches.  Denies any blood in urine.  No other urinary complaints at this time.       Past Medical History:   Past Medical History:   Diagnosis Date    Anxiety     Cancer     thyroid     Depression     Hypothyroidism        Past Surgical Historical:   Past Surgical History:   Procedure Laterality Date    MOUTH SURGERY      SPINE SURGERY  07/07/2020    T4-L3 PSF w/ SUBLAMINAR BANDS & SPOs for scoli correction, 7/7/2020, Dr. Jimmy REDDING.    TOTAL THYROIDECTOMY      TUBAL LIGATION      bilateral tubes removed         Medications:   Medication List with Changes/Refills   New Medications    METHEN-M.BLUE-S.PHOS-PHSAL-HYO (URIBEL) 118-10-40.8-36 MG CAP    Take 1 capsule by mouth 3 (three) times daily as needed (bladder pain).   Current Medications    CITALOPRAM (CELEXA) 40 MG TABLET    Take 1 tablet (40 mg total) by mouth once daily.    IBUPROFEN (ADVIL,MOTRIN) 800 MG TABLET         LIOTHYRONINE (CYTOMEL) 5 MCG TAB    Take 1 tablet (5 mcg total) by mouth once daily.    SYNTHROID 100 MCG TABLET    TAKE 1 TABLET (100 MCG TOTAL) BY MOUTH ONCE DAILY.        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    Drug use: Never       Allergies:   Review of patient's allergies indicates:   Allergen Reactions    Codeine Anaphylaxis        Family History:   Family History   Problem Relation Age of Onset    Arthritis Mother     Autoimmune disease Mother     Lupus Mother     Hypothyroidism Mother     Hypertension Father     Alcohol abuse Father     Hypothyroidism Father     Hypothyroidism Sister     Fibromyalgia Son     Asthma Son     Stroke Maternal Grandfather     Heart disease Maternal Grandfather     Cancer Paternal Grandmother         Review of Systems:  Review of Systems   Constitutional: Negative for activity change and appetite change.   HENT: Negative for congestion and dental problem.    Respiratory: Negative for chest tightness and shortness of breath.    Cardiovascular: Negative for chest pain.   Gastrointestinal: Negative for abdominal distention and abdominal pain.   Genitourinary: Positive for frequency, pelvic pain and urgency. Negative for decreased urine volume, difficulty urinating, dyspareunia, dysuria, enuresis, flank pain, genital sores and hematuria.   Musculoskeletal: Negative for back pain and neck pain.   Allergic/Immunologic: Negative for immunocompromised state.   Neurological: Negative for dizziness.   Hematological: Negative for adenopathy.   Psychiatric/Behavioral: Negative for agitation, behavioral problems and confusion.       Physical Exam:  Physical Exam  Vitals and nursing note reviewed.    Constitutional:       Appearance: She is well-developed.   HENT:      Head: Normocephalic.   Eyes:      Pupils: Pupils are equal, round, and reactive to light.   Cardiovascular:      Rate and Rhythm: Normal rate and regular rhythm.      Heart sounds: Normal heart sounds.   Pulmonary:      Effort: Pulmonary effort is normal.      Breath sounds: Normal breath sounds.   Abdominal:      General: Bowel sounds are normal.      Palpations: Abdomen is soft.   Musculoskeletal:         General: Normal range of motion.      Cervical back: Normal range of motion and neck supple.   Skin:     General: Skin is warm and dry.   Neurological:      Mental Status: She is alert and oriented to person, place, and time.   Psychiatric:         Behavior: Behavior normal.       Urinalysis:  Normal  Bladder scan:  0 cc    Assessment/Plan:   1. Interstitial cystitis:  Patient does pretty well with an IC diet.  However, she has noticed her a recent increase in flare ups.  She has had success with DMSO treatments in the past.  Patient would like to repeat DMSO treatment.    2. Frequency/urgency:  Did discuss use medications such as oxybutynin or VESIcare.  Patient at this time declines.  She would like to minimize her prescription medication use.    3. Bladder spasms:  Will provide patient with some Uribel to use as needed for bladder spasms an IC flare up.    Follow-up to arrange post DMSO treatment.  Follow-up sooner if needed.  Problem List Items Addressed This Visit    None     Visit Diagnoses     Interstitial cystitis    -  Primary    Relevant Orders    POCT Urinalysis (w/Micro Option)    POCT Bladder Scan (Completed)    Urinary frequency        Relevant Orders    POCT Urinalysis (w/Micro Option)    POCT Bladder Scan (Completed)    Urinary urgency        Relevant Orders    POCT Urinalysis (w/Micro Option)    POCT Bladder Scan (Completed)    Bladder spasm        Relevant Medications    methen-m.blue-s.phos-phsal-hyo (URIBEL) 118-10-40.8-36  mg Cap    Other Relevant Orders    POCT Urinalysis (w/Micro Option)    POCT Bladder Scan (Completed)

## 2022-08-29 ENCOUNTER — TELEPHONE (OUTPATIENT)
Dept: UROLOGY | Facility: CLINIC | Age: 32
End: 2022-08-29
Payer: MEDICAID

## 2022-09-09 ENCOUNTER — CLINICAL SUPPORT (OUTPATIENT)
Dept: UROLOGY | Facility: CLINIC | Age: 32
End: 2022-09-09
Payer: MEDICAID

## 2022-09-09 DIAGNOSIS — N30.10 INTERSTITIAL CYSTITIS: Primary | ICD-10-CM

## 2022-09-09 PROCEDURE — 51700 PR IRRIGATION, BLADDER: ICD-10-PCS | Mod: S$GLB,,, | Performed by: NURSE PRACTITIONER

## 2022-09-09 PROCEDURE — 51700 IRRIGATION OF BLADDER: CPT | Mod: S$GLB,,, | Performed by: NURSE PRACTITIONER

## 2022-09-09 NOTE — PROGRESS NOTES
Treatment #1. Cleansed urethra with Hibiclense. Inserted 14 Haitian red catheter into patient's bladder and drained the bladder. Step #1 pre-mixed (Heparin 1,000u/ml and Dexamethasone 8mg) instilled (slow push). Catheter held in place with plug. Patient was instructed to hold and rotate every 10 minutes for 30 minutes. After draining step #1 from bladder step #2 (DMSO irrigation 50ml was instilled (slow push). Catheter was discontinued. Patient was instructed to hold for 20 minutes then void. Patient tolerated procedure without complaints. Pt verbalized understanding of post instructions.

## 2022-09-16 ENCOUNTER — CLINICAL SUPPORT (OUTPATIENT)
Dept: UROLOGY | Facility: CLINIC | Age: 32
End: 2022-09-16
Payer: MEDICAID

## 2022-09-16 DIAGNOSIS — N30.10 INTERSTITIAL CYSTITIS: Primary | ICD-10-CM

## 2022-09-27 ENCOUNTER — TELEPHONE (OUTPATIENT)
Dept: UROLOGY | Facility: CLINIC | Age: 32
End: 2022-09-27
Payer: MEDICAID

## 2022-09-27 NOTE — TELEPHONE ENCOUNTER
Called patient to inquire if planned on continuing bladder instillations, stated pain is significantly worse than her previously instillations and she does not wish to proceed at this time. Advised on importance of following diet, lifestyle changes, etc. Verbalized understanding,.

## 2023-01-30 ENCOUNTER — TELEPHONE (OUTPATIENT)
Dept: OBSTETRICS AND GYNECOLOGY | Facility: CLINIC | Age: 33
End: 2023-01-30
Payer: MEDICAID

## 2023-01-30 NOTE — TELEPHONE ENCOUNTER
----- Message from Socorro Mcdaniels sent at 1/30/2023  2:00 PM CST -----  Contact: self  Pt called and asked if she can get a call back regarding bad pelvic pain. Pt can be reached at [phone]

## 2023-02-09 ENCOUNTER — OFFICE VISIT (OUTPATIENT)
Dept: OBSTETRICS AND GYNECOLOGY | Facility: CLINIC | Age: 33
End: 2023-02-09
Payer: MEDICAID

## 2023-02-09 VITALS
SYSTOLIC BLOOD PRESSURE: 116 MMHG | WEIGHT: 167 LBS | HEART RATE: 102 BPM | BODY MASS INDEX: 23.29 KG/M2 | DIASTOLIC BLOOD PRESSURE: 76 MMHG

## 2023-02-09 DIAGNOSIS — R10.2 PELVIC PAIN: Primary | ICD-10-CM

## 2023-02-09 PROCEDURE — 3078F DIAST BP <80 MM HG: CPT | Mod: CPTII,S$GLB,, | Performed by: OBSTETRICS & GYNECOLOGY

## 2023-02-09 PROCEDURE — 3008F BODY MASS INDEX DOCD: CPT | Mod: CPTII,S$GLB,, | Performed by: OBSTETRICS & GYNECOLOGY

## 2023-02-09 PROCEDURE — 99213 PR OFFICE/OUTPT VISIT, EST, LEVL III, 20-29 MIN: ICD-10-PCS | Mod: S$GLB,,, | Performed by: OBSTETRICS & GYNECOLOGY

## 2023-02-09 PROCEDURE — 1159F PR MEDICATION LIST DOCUMENTED IN MEDICAL RECORD: ICD-10-PCS | Mod: CPTII,S$GLB,, | Performed by: OBSTETRICS & GYNECOLOGY

## 2023-02-09 PROCEDURE — 3078F PR MOST RECENT DIASTOLIC BLOOD PRESSURE < 80 MM HG: ICD-10-PCS | Mod: CPTII,S$GLB,, | Performed by: OBSTETRICS & GYNECOLOGY

## 2023-02-09 PROCEDURE — 99213 OFFICE O/P EST LOW 20 MIN: CPT | Mod: S$GLB,,, | Performed by: OBSTETRICS & GYNECOLOGY

## 2023-02-09 PROCEDURE — 3074F PR MOST RECENT SYSTOLIC BLOOD PRESSURE < 130 MM HG: ICD-10-PCS | Mod: CPTII,S$GLB,, | Performed by: OBSTETRICS & GYNECOLOGY

## 2023-02-09 PROCEDURE — 3008F PR BODY MASS INDEX (BMI) DOCUMENTED: ICD-10-PCS | Mod: CPTII,S$GLB,, | Performed by: OBSTETRICS & GYNECOLOGY

## 2023-02-09 PROCEDURE — 1159F MED LIST DOCD IN RCRD: CPT | Mod: CPTII,S$GLB,, | Performed by: OBSTETRICS & GYNECOLOGY

## 2023-02-09 PROCEDURE — 3074F SYST BP LT 130 MM HG: CPT | Mod: CPTII,S$GLB,, | Performed by: OBSTETRICS & GYNECOLOGY

## 2023-02-09 RX ORDER — DOXYCYCLINE 100 MG/1
100 CAPSULE ORAL 2 TIMES DAILY
Qty: 14 CAPSULE | Refills: 0 | Status: SHIPPED | OUTPATIENT
Start: 2023-02-09 | End: 2023-02-16

## 2023-02-09 RX ORDER — LEVOTHYROXINE SODIUM 88 UG/1
TABLET ORAL
COMMUNITY
Start: 2023-01-30

## 2023-02-09 NOTE — PROGRESS NOTES
Subjective:       Patient ID: Kimber Herndon is a 32 y.o. female.    Chief Complaint:  Pelvic Pain      History of Present Illness  Pt here for pelvic pain.  History and past labs reviewed with patient.    Complaints of pelvic pain and pain with intercourse.       Review of Systems  Review of Systems   Constitutional:  Negative for chills and fever.   Respiratory:  Negative for shortness of breath.    Cardiovascular:  Negative for chest pain.   Gastrointestinal:  Negative for abdominal pain, blood in stool, constipation, diarrhea, nausea, vomiting and reflux.   Genitourinary:  Positive for pelvic pain. Negative for dysmenorrhea, dyspareunia, dysuria, hematuria, hot flashes, menorrhagia, menstrual problem, vaginal bleeding, vaginal discharge, postcoital bleeding and vaginal dryness.   Musculoskeletal:  Negative for arthralgias and joint swelling.   Integumentary:  Negative for rash, hair changes, breast mass, nipple discharge and breast skin changes.   Psychiatric/Behavioral:  Negative for depression. The patient is not nervous/anxious.    Breast: Negative for asymmetry, lump, mass, nipple discharge and skin changes        Objective:     Vitals:    02/09/23 0953   BP: 116/76   Pulse: 102   Weight: 75.8 kg (167 lb)       Physical Exam:   Constitutional: She appears well-developed and well-nourished. No distress.    HENT:   Head: Normocephalic and atraumatic.    Eyes: Conjunctivae and EOM are normal.    Neck: No tracheal deviation present. No thyromegaly present.    Cardiovascular:       Exam reveals no clubbing, no cyanosis and no edema.        Pulmonary/Chest: Effort normal. No respiratory distress.        Abdominal: Soft. She exhibits no distension and no mass. There is no abdominal tenderness. There is no rebound and no guarding. No hernia.     Genitourinary:    Vagina and rectum normal.      Pelvic exam was performed with patient supine.   There is no rash, tenderness, lesion or injury on the right  labia. There is no rash, tenderness, lesion or injury on the left labia. Cervix is normal. Right adnexum displays no mass, no tenderness and no fullness. Left adnexum displays no mass, no tenderness and no fullness. Uterus consistancy normal. and Uerus contour normal  Uterus is tender.                Skin: She is not diaphoretic. No cyanosis. Nails show no clubbing.            Assessment:        1. Pelvic pain                Plan:      Doxycycline rx given   STD panel collected   RTC PRN

## 2023-02-13 LAB
CHLAMYDIA: NEGATIVE
GONORRHEA: NEGATIVE
SOURCE: NORMAL
SOURCE: NORMAL
TRICHOMONAS AMPLIFIED: NEGATIVE

## 2023-02-14 DIAGNOSIS — R10.2 PELVIC PAIN: Primary | ICD-10-CM

## 2023-02-15 ENCOUNTER — PROCEDURE VISIT (OUTPATIENT)
Dept: OBSTETRICS AND GYNECOLOGY | Facility: CLINIC | Age: 33
End: 2023-02-15
Payer: MEDICAID

## 2023-02-15 DIAGNOSIS — R10.2 PELVIC PAIN: ICD-10-CM

## 2023-02-15 PROCEDURE — 76830 TRANSVAGINAL US NON-OB: CPT | Mod: S$GLB,,, | Performed by: OBSTETRICS & GYNECOLOGY

## 2023-02-15 PROCEDURE — 76830 US OB/GYN PROCEDURE (VIEWPOINT): ICD-10-PCS | Mod: S$GLB,,, | Performed by: OBSTETRICS & GYNECOLOGY

## 2023-05-29 ENCOUNTER — PATIENT MESSAGE (OUTPATIENT)
Dept: ADMINISTRATIVE | Facility: HOSPITAL | Age: 33
End: 2023-05-29
Payer: MEDICAID

## 2023-06-05 ENCOUNTER — OFFICE VISIT (OUTPATIENT)
Dept: FAMILY MEDICINE | Facility: CLINIC | Age: 33
End: 2023-06-05
Payer: MEDICAID

## 2023-06-05 DIAGNOSIS — E88.819 INSULIN RESISTANCE: ICD-10-CM

## 2023-06-05 DIAGNOSIS — F41.9 ANXIETY: ICD-10-CM

## 2023-06-05 DIAGNOSIS — E89.0 POSTOPERATIVE HYPOTHYROIDISM: ICD-10-CM

## 2023-06-05 DIAGNOSIS — J45.20 MILD INTERMITTENT ASTHMA, UNSPECIFIED WHETHER COMPLICATED: ICD-10-CM

## 2023-06-05 DIAGNOSIS — F32.A DEPRESSION, UNSPECIFIED DEPRESSION TYPE: Primary | ICD-10-CM

## 2023-06-05 PROCEDURE — 99214 PR OFFICE/OUTPT VISIT, EST, LEVL IV, 30-39 MIN: ICD-10-PCS | Mod: 95,,, | Performed by: FAMILY MEDICINE

## 2023-06-05 PROCEDURE — 99214 OFFICE O/P EST MOD 30 MIN: CPT | Mod: 95,,, | Performed by: FAMILY MEDICINE

## 2023-06-05 RX ORDER — ALBUTEROL SULFATE 90 UG/1
2 AEROSOL, METERED RESPIRATORY (INHALATION) EVERY 6 HOURS PRN
Qty: 18 G | Refills: 0 | Status: SHIPPED | OUTPATIENT
Start: 2023-06-05

## 2023-06-05 RX ORDER — BUPROPION HYDROCHLORIDE 150 MG/1
150 TABLET ORAL DAILY
Qty: 30 TABLET | Refills: 11 | Status: SHIPPED | OUTPATIENT
Start: 2023-06-05 | End: 2024-06-04

## 2023-06-05 RX ORDER — CITALOPRAM 40 MG/1
40 TABLET, FILM COATED ORAL DAILY
Qty: 30 TABLET | Refills: 5 | Status: SHIPPED | OUTPATIENT
Start: 2023-06-05 | End: 2024-03-10 | Stop reason: SDUPTHER

## 2023-06-05 NOTE — PROGRESS NOTES
Subjective:      Patient ID: Kimber Herndon is a 32 y.o. female.    Chief Complaint: No chief complaint on file.    The patient location is: home  The chief complaint leading to consultation is: anxiety, wheezing    Visit type: audiovisual    Face to Face time with patient: 10 minutes  15 minutes of total time spent on the encounter, which includes face to face time and non-face to face time preparing to see the patient (eg, review of tests), Obtaining and/or reviewing separately obtained history, Documenting clinical information in the electronic or other health record, Independently interpreting results (not separately reported) and communicating results to the patient/family/caregiver, or Care coordination (not separately reported).         Each patient to whom he or she provides medical services by telemedicine is:  (1) informed of the relationship between the physician and patient and the respective role of any other health care provider with respect to management of the patient; and (2) notified that he or she may decline to receive medical services by telemedicine and may withdraw from such care at any time.    Notes:       HPI:  32-year-old female who presents for chronic med management.  Unable to drive for face-to-face visit today.  Following with endocrinology.  Recently started on metformin for insulin resistance.  Mood mostly controlled on Celexa.  She had a friend recently .  She has have intermittent depression.  She used to use Ventolin as needed for asthma.  Would like a refill.    Past Medical History:   Diagnosis Date    Anxiety     Cancer     thyroid     Depression     Hypothyroidism      Past Surgical History:   Procedure Laterality Date    MOUTH SURGERY      SPINE SURGERY  2020    T4-L3 PSF w/ SUBLAMINAR BANDS & SPOs for scoli correction, 2020, Dr. Jimmy REDDING.    TOTAL THYROIDECTOMY      TUBAL LIGATION      bilateral tubes removed      Family History   Problem Relation Age  of Onset    Arthritis Mother     Autoimmune disease Mother     Lupus Mother     Hypothyroidism Mother     Hypertension Father     Alcohol abuse Father     Hypothyroidism Father     Hypothyroidism Sister     Fibromyalgia Son     Asthma Son     Stroke Maternal Grandfather     Heart disease Maternal Grandfather     Cancer Paternal Grandmother      Social History     Socioeconomic History    Marital status:    Tobacco Use    Smoking status: Former     Types: Cigarettes     Quit date:      Years since quittin.4    Smokeless tobacco: Never   Substance and Sexual Activity    Alcohol use: Not Currently     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    Drug use: Never     Review of patient's allergies indicates:   Allergen Reactions    Codeine Anaphylaxis       Review of Systems   Constitutional:  Negative for activity change and unexpected weight change.   HENT:  Negative for hearing loss, rhinorrhea and trouble swallowing.    Eyes:  Negative for discharge and visual disturbance.   Respiratory:  Positive for wheezing. Negative for chest tightness.    Cardiovascular:  Positive for palpitations. Negative for chest pain.   Gastrointestinal:  Negative for blood in stool, constipation, diarrhea and vomiting.   Endocrine: Negative for polydipsia and polyuria.   Genitourinary:  Negative for difficulty urinating, dysuria, hematuria and menstrual problem.   Musculoskeletal:  Negative for arthralgias, joint swelling and neck pain.   Neurological:  Negative for weakness and headaches.   Psychiatric/Behavioral:  Negative for confusion and dysphoric mood.      Objective:       There were no vitals taken for this visit.  Physical Exam  Constitutional:       Appearance: She is well-developed.   HENT:      Head: Normocephalic and atraumatic.   Eyes:      Conjunctiva/sclera: Conjunctivae normal.   Pulmonary:      Effort: Pulmonary effort is normal.   Neurological:      Mental Status: She is alert and oriented to person, place, and time.    Psychiatric:         Behavior: Behavior normal.         Thought Content: Thought content normal.         Judgment: Judgment normal.       Assessment:     1. Depression, unspecified depression type    2. Anxiety    3. Insulin resistance    4. Mild intermittent asthma, unspecified whether complicated    5. Postoperative hypothyroidism        Plan:   Depression, unspecified depression type  -     buPROPion (WELLBUTRIN XL) 150 MG TB24 tablet; Take 1 tablet (150 mg total) by mouth once daily.  Dispense: 30 tablet; Refill: 11    Anxiety  -     citalopram (CELEXA) 40 MG tablet; Take 1 tablet (40 mg total) by mouth once daily.  Dispense: 30 tablet; Refill: 5    Insulin resistance    Mild intermittent asthma, unspecified whether complicated  -     albuterol (VENTOLIN HFA) 90 mcg/actuation inhaler; Inhale 2 puffs into the lungs every 6 (six) hours as needed for Wheezing. Rescue  Dispense: 18 g; Refill: 0    Postoperative hypothyroidism      Add Wellbutrin.      Continue Celexa.      Recent note reviewed from Endocrinology.      Ventolin as needed.    Medication List with Changes/Refills   New Medications    ALBUTEROL (VENTOLIN HFA) 90 MCG/ACTUATION INHALER    Inhale 2 puffs into the lungs every 6 (six) hours as needed for Wheezing. Rescue    BUPROPION (WELLBUTRIN XL) 150 MG TB24 TABLET    Take 1 tablet (150 mg total) by mouth once daily.   Current Medications    IBUPROFEN (ADVIL,MOTRIN) 800 MG TABLET        LIOTHYRONINE (CYTOMEL) 5 MCG TAB    Take 1 tablet (5 mcg total) by mouth once daily.    REYNA-M.BLUE-S.PHOS-PHSAL-HYO (URIBEL) 118-10-40.8-36 MG CAP    Take 1 capsule by mouth 3 (three) times daily as needed (bladder pain).    SYNTHROID 88 MCG TABLET       Changed and/or Refilled Medications    Modified Medication Previous Medication    CITALOPRAM (CELEXA) 40 MG TABLET citalopram (CELEXA) 40 MG tablet       Take 1 tablet (40 mg total) by mouth once daily.    Take 1 tablet (40 mg total) by mouth once daily.             Disclaimer: This note may have been prepared using voice recognition software, it may have not been extensively proofed, as such there could be errors within the text such as sound alike errors.

## 2023-10-12 ENCOUNTER — PATIENT MESSAGE (OUTPATIENT)
Dept: FAMILY MEDICINE | Facility: CLINIC | Age: 33
End: 2023-10-12
Payer: MEDICAID

## 2023-10-19 ENCOUNTER — OFFICE VISIT (OUTPATIENT)
Dept: FAMILY MEDICINE | Facility: CLINIC | Age: 33
End: 2023-10-19
Payer: MEDICAID

## 2023-10-19 DIAGNOSIS — F32.A DEPRESSION, UNSPECIFIED DEPRESSION TYPE: ICD-10-CM

## 2023-10-19 DIAGNOSIS — K21.9 GASTROESOPHAGEAL REFLUX DISEASE, UNSPECIFIED WHETHER ESOPHAGITIS PRESENT: ICD-10-CM

## 2023-10-19 DIAGNOSIS — M41.9 SCOLIOSIS, UNSPECIFIED SCOLIOSIS TYPE, UNSPECIFIED SPINAL REGION: ICD-10-CM

## 2023-10-19 DIAGNOSIS — L71.9 ROSACEA: Primary | ICD-10-CM

## 2023-10-19 DIAGNOSIS — L71.0 PERIORAL DERMATITIS: ICD-10-CM

## 2023-10-19 DIAGNOSIS — K58.1 IRRITABLE BOWEL SYNDROME WITH CONSTIPATION: ICD-10-CM

## 2023-10-19 PROCEDURE — 99214 OFFICE O/P EST MOD 30 MIN: CPT | Mod: 95,,, | Performed by: FAMILY MEDICINE

## 2023-10-19 PROCEDURE — 99214 PR OFFICE/OUTPT VISIT, EST, LEVL IV, 30-39 MIN: ICD-10-PCS | Mod: 95,,, | Performed by: FAMILY MEDICINE

## 2023-10-19 RX ORDER — PANTOPRAZOLE SODIUM 40 MG/1
40 TABLET, DELAYED RELEASE ORAL DAILY
Qty: 30 TABLET | Refills: 2 | Status: SHIPPED | OUTPATIENT
Start: 2023-10-19 | End: 2024-10-18

## 2023-10-19 RX ORDER — METRONIDAZOLE 7.5 MG/G
GEL TOPICAL 2 TIMES DAILY
Qty: 45 G | Refills: 3 | Status: SHIPPED | OUTPATIENT
Start: 2023-10-19 | End: 2024-10-18

## 2023-10-19 NOTE — PROGRESS NOTES
Subjective:      Patient ID: Kimber Herndon is a 33 y.o. female.    Chief Complaint: No chief complaint on file.    The patient location is: home  The chief complaint leading to consultation is: multiple    Visit type: audiovisual    Face to Face time with patient: 15 minutes  23 minutes of total time spent on the encounter, which includes face to face time and non-face to face time preparing to see the patient (eg, review of tests), Obtaining and/or reviewing separately obtained history, Documenting clinical information in the electronic or other health record, Independently interpreting results (not separately reported) and communicating results to the patient/family/caregiver, or Care coordination (not separately reported).         Each patient to whom he or she provides medical services by telemedicine is:  (1) informed of the relationship between the physician and patient and the respective role of any other health care provider with respect to management of the patient; and (2) notified that he or she may decline to receive medical services by telemedicine and may withdraw from such care at any time.    Notes:     HPI:  Female presents with multiple complaints.  Complains of some emotional flattening with Wellbutrin and Celexa.  Recently saw neurosurgeon and endocrinologist.  Complains of constipation.  Tried Linzess but did not like it.  Does have some abdominal cramping and GERD.  Was worried she may have Crohn's or ulcerative colitis.  She does notice a rash on her face.    Past Medical History:   Diagnosis Date    Anxiety     Cancer     thyroid     Depression     Hypothyroidism      Past Surgical History:   Procedure Laterality Date    MOUTH SURGERY      SPINE SURGERY  07/07/2020    T4-L3 PSF w/ SUBLAMINAR BANDS & SPOs for scoli correction, 7/7/2020, Dr. Jimmy REDDING.    TOTAL THYROIDECTOMY      TUBAL LIGATION      bilateral tubes removed      Family History   Problem Relation Age of Onset    Arthritis  Mother     Autoimmune disease Mother     Lupus Mother     Hypothyroidism Mother     Hypertension Father     Alcohol abuse Father     Hypothyroidism Father     Hypothyroidism Sister     Fibromyalgia Son     Asthma Son     Stroke Maternal Grandfather     Heart disease Maternal Grandfather     Cancer Paternal Grandmother      Social History     Socioeconomic History    Marital status:    Tobacco Use    Smoking status: Former     Current packs/day: 0.00     Types: Cigarettes     Quit date:      Years since quittin.8    Smokeless tobacco: Never   Substance and Sexual Activity    Alcohol use: Not Currently     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    Drug use: Never     Review of patient's allergies indicates:   Allergen Reactions    Codeine Anaphylaxis       Review of Systems   Constitutional:  Negative for activity change and unexpected weight change.   HENT:  Negative for hearing loss, rhinorrhea and trouble swallowing.    Eyes:  Negative for discharge and visual disturbance.   Respiratory:  Negative for chest tightness and wheezing.    Cardiovascular:  Negative for chest pain and palpitations.   Gastrointestinal:  Positive for blood in stool, constipation, diarrhea and vomiting.   Endocrine: Negative for polydipsia and polyuria.   Genitourinary:  Negative for difficulty urinating, dysuria, hematuria and menstrual problem.   Musculoskeletal:  Positive for arthralgias. Negative for joint swelling and neck pain.   Neurological:  Positive for headaches. Negative for weakness.   Psychiatric/Behavioral:  Negative for confusion and dysphoric mood.        Objective:       There were no vitals taken for this visit.  Physical Exam  Constitutional:       Appearance: She is well-developed.   HENT:      Head: Normocephalic and atraumatic.   Eyes:      Conjunctiva/sclera: Conjunctivae normal.   Pulmonary:      Effort: Pulmonary effort is normal.   Neurological:      Mental Status: She is alert and oriented to person, place, and  time.   Psychiatric:         Behavior: Behavior normal.         Thought Content: Thought content normal.         Judgment: Judgment normal.         Assessment:     1. Rosacea    2. Perioral dermatitis    3. Scoliosis, unspecified scoliosis type, unspecified spinal region    4. Depression, unspecified depression type    5. Irritable bowel syndrome with constipation    6. Gastroesophageal reflux disease, unspecified whether esophagitis present        Plan:   Rosacea  -     metroNIDAZOLE (METROGEL) 0.75 % gel; Apply topically 2 (two) times daily.  Dispense: 45 g; Refill: 3    Perioral dermatitis  -     metroNIDAZOLE (METROGEL) 0.75 % gel; Apply topically 2 (two) times daily.  Dispense: 45 g; Refill: 3    Scoliosis, unspecified scoliosis type, unspecified spinal region    Depression, unspecified depression type    Irritable bowel syndrome with constipation  -     Ambulatory referral/consult to Gastroenterology; Future; Expected date: 10/26/2023    Gastroesophageal reflux disease, unspecified whether esophagitis present  -     pantoprazole (PROTONIX) 40 MG tablet; Take 1 tablet (40 mg total) by mouth once daily.  Dispense: 30 tablet; Refill: 2       .      Refer to GI per patient request.      Trial of Protonix.      Consider Amitiza.  Patient declined today.      Can cut Celexa and half for now.  See if this works.      She has been losing weight.  Down to 155.    Medication List with Changes/Refills   New Medications    METRONIDAZOLE (METROGEL) 0.75 % GEL    Apply topically 2 (two) times daily.    PANTOPRAZOLE (PROTONIX) 40 MG TABLET    Take 1 tablet (40 mg total) by mouth once daily.   Current Medications    ALBUTEROL (VENTOLIN HFA) 90 MCG/ACTUATION INHALER    Inhale 2 puffs into the lungs every 6 (six) hours as needed for Wheezing. Rescue    BUPROPION (WELLBUTRIN XL) 150 MG TB24 TABLET    Take 1 tablet (150 mg total) by mouth once daily.    CITALOPRAM (CELEXA) 40 MG TABLET    Take 1 tablet (40 mg total) by mouth once  daily.    IBUPROFEN (ADVIL,MOTRIN) 800 MG TABLET        LIOTHYRONINE (CYTOMEL) 5 MCG TAB    Take 1 tablet (5 mcg total) by mouth once daily.    METHEN-M.BLUE-S.PHOS-PHSAL-HYO (URIBEL) 118-10-40.8-36 MG CAP    Take 1 capsule by mouth 3 (three) times daily as needed (bladder pain).    SYNTHROID 88 MCG TABLET                Disclaimer: This note may have been prepared using voice recognition software, it may have not been extensively proofed, as such there could be errors within the text such as sound alike errors.

## 2023-10-22 ENCOUNTER — PATIENT MESSAGE (OUTPATIENT)
Dept: FAMILY MEDICINE | Facility: CLINIC | Age: 33
End: 2023-10-22
Payer: MEDICAID

## 2024-02-21 PROBLEM — K59.00 CONSTIPATION: Status: ACTIVE | Noted: 2024-02-21

## 2024-03-10 DIAGNOSIS — F41.9 ANXIETY: ICD-10-CM

## 2024-03-11 RX ORDER — CITALOPRAM 40 MG/1
40 TABLET, FILM COATED ORAL DAILY
Qty: 30 TABLET | Refills: 5 | Status: SHIPPED | OUTPATIENT
Start: 2024-03-11 | End: 2024-06-10

## 2024-03-26 ENCOUNTER — OFFICE VISIT (OUTPATIENT)
Dept: FAMILY MEDICINE | Facility: CLINIC | Age: 34
End: 2024-03-26
Payer: MEDICAID

## 2024-03-26 VITALS
DIASTOLIC BLOOD PRESSURE: 80 MMHG | BODY MASS INDEX: 22.37 KG/M2 | OXYGEN SATURATION: 98 % | HEART RATE: 85 BPM | SYSTOLIC BLOOD PRESSURE: 106 MMHG | WEIGHT: 159.81 LBS | HEIGHT: 71 IN

## 2024-03-26 DIAGNOSIS — M06.09 RHEUMATOID ARTHRITIS OF MULTIPLE SITES WITH NEGATIVE RHEUMATOID FACTOR: Primary | ICD-10-CM

## 2024-03-26 DIAGNOSIS — K59.04 CHRONIC IDIOPATHIC CONSTIPATION: ICD-10-CM

## 2024-03-26 DIAGNOSIS — N30.10 IC (INTERSTITIAL CYSTITIS): ICD-10-CM

## 2024-03-26 DIAGNOSIS — E89.0 POSTOPERATIVE HYPOTHYROIDISM: ICD-10-CM

## 2024-03-26 DIAGNOSIS — F41.9 ANXIETY: ICD-10-CM

## 2024-03-26 DIAGNOSIS — F10.11 HISTORY OF ETOH ABUSE: ICD-10-CM

## 2024-03-26 DIAGNOSIS — Z79.899 ON LONG TERM DRUG THERAPY: ICD-10-CM

## 2024-03-26 PROCEDURE — 3074F SYST BP LT 130 MM HG: CPT | Mod: CPTII,S$GLB,, | Performed by: STUDENT IN AN ORGANIZED HEALTH CARE EDUCATION/TRAINING PROGRAM

## 2024-03-26 PROCEDURE — 99214 OFFICE O/P EST MOD 30 MIN: CPT | Mod: S$GLB,,, | Performed by: STUDENT IN AN ORGANIZED HEALTH CARE EDUCATION/TRAINING PROGRAM

## 2024-03-26 PROCEDURE — 1159F MED LIST DOCD IN RCRD: CPT | Mod: CPTII,S$GLB,, | Performed by: STUDENT IN AN ORGANIZED HEALTH CARE EDUCATION/TRAINING PROGRAM

## 2024-03-26 PROCEDURE — 3008F BODY MASS INDEX DOCD: CPT | Mod: CPTII,S$GLB,, | Performed by: STUDENT IN AN ORGANIZED HEALTH CARE EDUCATION/TRAINING PROGRAM

## 2024-03-26 PROCEDURE — 3079F DIAST BP 80-89 MM HG: CPT | Mod: CPTII,S$GLB,, | Performed by: STUDENT IN AN ORGANIZED HEALTH CARE EDUCATION/TRAINING PROGRAM

## 2024-03-26 RX ORDER — METHOCARBAMOL 750 MG/1
500 TABLET, FILM COATED ORAL 4 TIMES DAILY
COMMUNITY
End: 2024-03-26 | Stop reason: SDUPTHER

## 2024-03-26 RX ORDER — METFORMIN HYDROCHLORIDE 500 MG/1
500 TABLET, EXTENDED RELEASE ORAL 2 TIMES DAILY
COMMUNITY
Start: 2024-03-02

## 2024-03-26 RX ORDER — METHOCARBAMOL 750 MG/1
750 TABLET, FILM COATED ORAL NIGHTLY PRN
Qty: 30 TABLET | Refills: 3 | Status: SHIPPED | OUTPATIENT
Start: 2024-03-26

## 2024-03-26 NOTE — PROGRESS NOTES
Subjective:      Patient ID: Kimber Herndon is a 33 y.o. female.    Chief Complaint: Follow-up      HPI:  33-year-old female presents today for follow-up of chronic medical conditions.  Patient states overall she is doing well.  Thinks she just got a refill on her Celexa.  She has been following with GI.  Started taking her muscle relaxer nightly.  Doing much better with this.  She does request a refill.  Is following with Hematology Dr. Tran.  Was recently diagnosed with rheumatoid arthritis.  Not following with Rheumatology.  Continues to follow with Dr. Gongora for monitoring of her thyroid functions.  Follows up every 6 months.  Recently put on metformin.  She does request a handicap tag.  Her fiance is traveling more frequently.  Some days she does not have to use an assistive device for walking.  However when she has a flare it is hard for her to walk more than 200 ft.  She is ready for labs today.    Past Medical History:   Diagnosis Date    Anxiety     Cancer     thyroid     Depression     Hypothyroidism      Past Surgical History:   Procedure Laterality Date    MOUTH SURGERY      SPINE SURGERY  07/07/2020    T4-L3 PSF w/ SUBLAMINAR BANDS & SPOs for scoli correction, 7/7/2020, [facility], Dr. Marquez.    TOTAL THYROIDECTOMY      TUBAL LIGATION      bilateral tubes removed      Family History   Problem Relation Age of Onset    Arthritis Mother     Autoimmune disease Mother     Lupus Mother     Hypothyroidism Mother     Hypertension Father     Alcohol abuse Father     Hypothyroidism Father     Hypothyroidism Sister     Fibromyalgia Son     Asthma Son     Stroke Maternal Grandfather     Heart disease Maternal Grandfather     Cancer Paternal Grandmother      Social History     Socioeconomic History    Marital status:    Tobacco Use    Smoking status: Former     Current packs/day: 0.00     Types: Cigarettes     Quit date: 2014     Years since quitting: 10.2    Smokeless tobacco: Never   Substance and  "Sexual Activity    Alcohol use: Not Currently     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    Drug use: Never     Review of patient's allergies indicates:   Allergen Reactions    Codeine Anaphylaxis       Review of Systems   Constitutional:  Negative for activity change, appetite change, fatigue and unexpected weight change.   HENT:  Negative for sinus pain.    Respiratory:  Negative for cough and shortness of breath.    Cardiovascular:  Negative for chest pain.   Gastrointestinal:  Negative for abdominal pain, nausea and vomiting.   Genitourinary:  Negative for difficulty urinating.   Musculoskeletal:  Negative for arthralgias and myalgias.   Neurological:  Negative for dizziness and headaches.   Psychiatric/Behavioral:  The patient is not nervous/anxious.        Objective:       /80 (BP Location: Left arm, Patient Position: Sitting, BP Method: Large (Manual))   Pulse 85   Ht 5' 11" (1.803 m)   Wt 72.5 kg (159 lb 12.8 oz)   SpO2 98%   BMI 22.29 kg/m²   Physical Exam  Constitutional:       Appearance: She is well-developed.   HENT:      Head: Normocephalic and atraumatic.   Eyes:      Conjunctiva/sclera: Conjunctivae normal.   Cardiovascular:      Rate and Rhythm: Normal rate and regular rhythm.   Pulmonary:      Effort: Pulmonary effort is normal.   Abdominal:      Palpations: Abdomen is soft.   Musculoskeletal:         General: Normal range of motion.      Cervical back: Normal range of motion and neck supple.   Skin:     General: Skin is warm and dry.   Neurological:      General: No focal deficit present.      Mental Status: She is alert and oriented to person, place, and time.   Psychiatric:         Mood and Affect: Mood normal.         Assessment:     1. Rheumatoid arthritis of multiple sites with negative rheumatoid factor    2. Postoperative hypothyroidism    3. Chronic idiopathic constipation    4. IC (interstitial cystitis)    5. Anxiety    6. On long term drug therapy    7. History of ETOH abuse        Plan: "   Rheumatoid arthritis of multiple sites with negative rheumatoid factor  -     methocarbamoL (ROBAXIN) 750 MG Tab; Take 1 tablet (750 mg total) by mouth nightly as needed.  Dispense: 30 tablet; Refill: 3    Postoperative hypothyroidism  -     TSH; Future; Expected date: 03/26/2024  -     T4, Free; Future; Expected date: 03/26/2024    Chronic idiopathic constipation  -     Ambulatory referral/consult to Physical/Occupational Therapy; Future; Expected date: 04/02/2024    IC (interstitial cystitis)  -     Ambulatory referral/consult to Physical/Occupational Therapy; Future; Expected date: 04/02/2024    Anxiety    On long term drug therapy  -     CBC Auto Differential; Future; Expected date: 03/26/2024  -     Comprehensive Metabolic Panel; Future; Expected date: 03/26/2024  -     Hemoglobin A1C; Future; Expected date: 03/26/2024  -     Lipid Panel; Future; Expected date: 03/26/2024  -     TSH; Future; Expected date: 03/26/2024  -     T4, Free; Future; Expected date: 03/26/2024    History of ETOH abuse      Robaxin p.r.n..      Labs pending.      Pelvic floor therapy referral pending.      Continue Celexa.  Can call for refills when needed.      Congratulated patient on sobriety.      Follows with Dr. Gongora for thyroid.  Following with GI chronic constipation.  Medication List with Changes/Refills   Current Medications    ALBUTEROL (VENTOLIN HFA) 90 MCG/ACTUATION INHALER    Inhale 2 puffs into the lungs every 6 (six) hours as needed for Wheezing. Rescue    BUPROPION (WELLBUTRIN XL) 150 MG TB24 TABLET    Take 1 tablet (150 mg total) by mouth once daily.    CITALOPRAM (CELEXA) 40 MG TABLET    Take 1 tablet (40 mg total) by mouth once daily.    IBUPROFEN (ADVIL,MOTRIN) 800 MG TABLET        LIOTHYRONINE (CYTOMEL) 5 MCG TAB    Take 1 tablet (5 mcg total) by mouth once daily.    METFORMIN (GLUCOPHAGE-XR) 500 MG ER 24HR TABLET    Take 500 mg by mouth 2 (two) times daily.    METHEN-M.BLUE-S.PHOS-PHSAL-HYO (URIBEL)  118-10-40.8-36 MG CAP    Take 1 capsule by mouth 3 (three) times daily as needed (bladder pain).    METRONIDAZOLE (METROGEL) 0.75 % GEL    Apply topically 2 (two) times daily.    PANTOPRAZOLE (PROTONIX) 40 MG TABLET    Take 1 tablet (40 mg total) by mouth once daily.    SYNTHROID 88 MCG TABLET       Changed and/or Refilled Medications    Modified Medication Previous Medication    METHOCARBAMOL (ROBAXIN) 750 MG TAB methocarbamoL (ROBAXIN) 750 MG Tab       Take 1 tablet (750 mg total) by mouth nightly as needed.    Take 500 mg by mouth 4 (four) times daily.              Disclaimer: This note may have been prepared using voice recognition software, it may have not been extensively proofed, as such there could be errors within the text such as sound alike errors.

## 2024-03-27 LAB
ABS NRBC COUNT: 0 X 10 3/UL (ref 0–0.01)
ABSOLUTE BASOPHIL: 0.02 X 10 3/UL (ref 0–0.22)
ABSOLUTE EOSINOPHIL: 0.04 X 10 3/UL (ref 0.04–0.54)
ABSOLUTE IMMATURE GRAN: 0 X 10 3/UL (ref 0–0.04)
ABSOLUTE LYMPHOCYTE: 1.42 X 10 3/UL (ref 0.86–4.75)
ABSOLUTE MONOCYTE: 0.3 X 10 3/UL (ref 0.22–1.08)
ALBUMIN SERPL-MCNC: 4.7 G/DL (ref 3.5–5.2)
ALBUMIN/GLOB SERPL ELPH: 2.1 {RATIO} (ref 1–2.7)
ALP ISOS SERPL LEV INH-CCNC: 71 U/L (ref 35–105)
ALT (SGPT): 13 U/L (ref 0–33)
ANION GAP SERPL CALC-SCNC: 11 MMOL/L (ref 8–17)
AST SERPL-CCNC: 18 U/L (ref 0–32)
BASOPHILS NFR BLD: 0.5 % (ref 0.2–1.2)
BILIRUBIN, TOTAL: 0.36 MG/DL (ref 0–1.2)
BUN/CREAT SERPL: 20.7 (ref 6–20)
CALCIUM SERPL-MCNC: 9.6 MG/DL (ref 8.6–10.2)
CARBON DIOXIDE, CO2: 28 MMOL/L (ref 22–29)
CHLORIDE: 101 MMOL/L (ref 98–107)
CHOLEST SERPL-MSCNC: 192 MG/DL (ref 100–200)
CREAT SERPL-MCNC: 0.6 MG/DL (ref 0.5–0.9)
EOSINOPHIL NFR BLD: 1 % (ref 0.7–7)
ESTIMATED AVERAGE GLUCOSE: 91 MG/DL
GFR ESTIMATION: 120.72 ML/MIN/1.73M2
GLOBULIN: 2.2 G/DL (ref 1.5–4.5)
GLUCOSE: 65 MG/DL (ref 74–106)
HBA1C MFR BLD: 4.8 % (ref 4–6)
HCT VFR BLD AUTO: 38.7 % (ref 37–47)
HDLC SERPL-MCNC: 68 MG/DL
HGB BLD-MCNC: 12.5 G/DL (ref 12–16)
IMMATURE GRANULOCYTES: 0 % (ref 0–0.5)
LDL/HDL RATIO: 1.6 (ref 1–3)
LDLC SERPL CALC-MCNC: 110.8 MG/DL (ref 0–100)
LYMPHOCYTES NFR BLD: 34.6 % (ref 19.3–53.1)
MCH RBC QN AUTO: 30 PG (ref 27–32)
MCHC RBC AUTO-ENTMCNC: 32.3 G/DL (ref 32–36)
MCV RBC AUTO: 92.8 FL (ref 82–100)
MONOCYTES NFR BLD: 7.3 % (ref 4.7–12.5)
NEUTROPHILS # BLD AUTO: 2.32 X 10 3/UL (ref 2.15–7.56)
NEUTROPHILS NFR BLD: 56.6 % (ref 34–71.1)
NUCLEATED RED BLOOD CELLS: 0 /100 WBC (ref 0–0.2)
PLATELET # BLD AUTO: 215 X 10 3/UL (ref 135–400)
POTASSIUM: 4.3 MMOL/L (ref 3.5–5.1)
PROT SNV-MCNC: 6.9 G/DL (ref 6.4–8.3)
RBC # BLD AUTO: 4.17 X 10 6/UL (ref 4.2–5.4)
RDW-SD: 39.6 FL (ref 37–54)
SODIUM: 140 MMOL/L (ref 136–145)
T4, FREE: 1.24 NG/DL (ref 0.93–1.7)
TRIGL SERPL-MCNC: 66 MG/DL (ref 0–150)
TSH SERPL DL<=0.005 MIU/L-ACNC: 0.58 UIU/ML (ref 0.27–4.2)
UREA NITROGEN (BUN): 12.4 MG/DL (ref 6–20)
WBC # BLD: 4.1 X 10 3/UL (ref 4.3–10.8)

## 2024-04-04 ENCOUNTER — PATIENT MESSAGE (OUTPATIENT)
Dept: FAMILY MEDICINE | Facility: CLINIC | Age: 34
End: 2024-04-04
Payer: MEDICAID

## 2024-04-25 ENCOUNTER — OFFICE VISIT (OUTPATIENT)
Dept: FAMILY MEDICINE | Facility: CLINIC | Age: 34
End: 2024-04-25
Payer: MEDICAID

## 2024-04-25 DIAGNOSIS — F32.A DEPRESSION, UNSPECIFIED DEPRESSION TYPE: Primary | ICD-10-CM

## 2024-04-25 DIAGNOSIS — F41.9 ANXIETY: ICD-10-CM

## 2024-04-25 PROCEDURE — 3044F HG A1C LEVEL LT 7.0%: CPT | Mod: CPTII,95,, | Performed by: STUDENT IN AN ORGANIZED HEALTH CARE EDUCATION/TRAINING PROGRAM

## 2024-04-25 PROCEDURE — 99213 OFFICE O/P EST LOW 20 MIN: CPT | Mod: 95,,, | Performed by: STUDENT IN AN ORGANIZED HEALTH CARE EDUCATION/TRAINING PROGRAM

## 2024-04-25 RX ORDER — BUPROPION HYDROCHLORIDE 75 MG/1
37.5 TABLET ORAL 2 TIMES DAILY
Qty: 30 TABLET | Refills: 1 | Status: SHIPPED | OUTPATIENT
Start: 2024-04-25 | End: 2024-06-10 | Stop reason: SDUPTHER

## 2024-04-25 NOTE — PROGRESS NOTES
Subjective:      Patient ID: Kimber Herndon is a 33 y.o. female.    Chief Complaint: No chief complaint on file.  The patient location is:  Car  The chief complaint leading to consultation is:  Depression    Visit type: audiovisual    Face to Face time with patient:  10 minute  12 minutes of total time spent on the encounter, which includes face to face time and non-face to face time preparing to see the patient (eg, review of tests), Obtaining and/or reviewing separately obtained history, Documenting clinical information in the electronic or other health record, Independently interpreting results (not separately reported) and communicating results to the patient/family/caregiver, or Care coordination (not separately reported).         Each patient to whom he or she provides medical services by telemedicine is:  (1) informed of the relationship between the physician and patient and the respective role of any other health care provider with respect to management of the patient; and (2) notified that he or she may decline to receive medical services by telemedicine and may withdraw from such care at any time.    Notes:       HPI:  33-year-old female presents today for depression.  Patient states she does feel like her depression is not well controlled at the moment.  Had previously been doing well with Celexa by itself.  However the last time her depression was bad Wellbutrin did seem to help.  She would like to try this again.  Would like to try a lower dosage.  Recently got back from vacation.  Otherwise patient doing well.  No other acute complaints at this time.    Past Medical History:   Diagnosis Date    Anxiety     Cancer     thyroid     Depression     Hypothyroidism      Past Surgical History:   Procedure Laterality Date    MOUTH SURGERY      SPINE SURGERY  07/07/2020    T4-L3 PSF w/ SUBLAMINAR BANDS & SPOs for scoli correction, 7/7/2020, Dr. Jimmy REDDING.    TOTAL THYROIDECTOMY      TUBAL LIGATION       bilateral tubes removed      Family History   Problem Relation Name Age of Onset    Arthritis Mother      Autoimmune disease Mother      Lupus Mother      Hypothyroidism Mother      Hypertension Father      Alcohol abuse Father      Hypothyroidism Father      Hypothyroidism Sister      Fibromyalgia Son      Asthma Son      Stroke Maternal Grandfather      Heart disease Maternal Grandfather      Cancer Paternal Grandmother       Social History     Socioeconomic History    Marital status:    Tobacco Use    Smoking status: Former     Current packs/day: 0.00     Types: Cigarettes     Quit date: 2014     Years since quitting: 10.3    Smokeless tobacco: Never   Substance and Sexual Activity    Alcohol use: Not Currently     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    Drug use: Never     Social Determinants of Health     Financial Resource Strain: Low Risk  (4/24/2024)    Overall Financial Resource Strain (CARDIA)     Difficulty of Paying Living Expenses: Not very hard   Food Insecurity: Patient Declined (4/24/2024)    Hunger Vital Sign     Worried About Running Out of Food in the Last Year: Patient declined     Ran Out of Food in the Last Year: Patient declined   Transportation Needs: No Transportation Needs (4/24/2024)    PRAPARE - Transportation     Lack of Transportation (Medical): No     Lack of Transportation (Non-Medical): No   Physical Activity: Unknown (4/24/2024)    Exercise Vital Sign     Days of Exercise per Week: Patient declined   Stress: Stress Concern Present (4/24/2024)    [employer] - Occupational Stress Questionnaire     Feeling of Stress : Very much   Social Connections: Unknown (4/24/2024)    Social Connection and Isolation Panel [NHANES]     Frequency of Communication with Friends and Family: Three times a week     Frequency of Social Gatherings with Friends and Family: Once a week     Active Member of Clubs or Organizations: Yes     Attends Club or Organization Meetings: More than  4 times per year     Marital Status: Living with partner   Housing Stability: Unknown (4/24/2024)    Housing Stability Vital Sign     Unable to Pay for Housing in the Last Year: No     Review of patient's allergies indicates:   Allergen Reactions    Codeine Anaphylaxis       Review of Systems   Constitutional:  Negative for activity change and unexpected weight change.   HENT:  Negative for hearing loss, rhinorrhea and trouble swallowing.    Eyes:  Negative for discharge and visual disturbance.   Respiratory:  Negative for chest tightness and wheezing.    Cardiovascular:  Negative for chest pain and palpitations.   Gastrointestinal:  Negative for blood in stool, constipation, diarrhea and vomiting.   Endocrine: Negative for polydipsia and polyuria.   Genitourinary:  Negative for difficulty urinating, dysuria, hematuria and menstrual problem.   Musculoskeletal:  Negative for arthralgias, joint swelling and neck pain.   Neurological:  Negative for weakness and headaches.   Psychiatric/Behavioral:  Positive for dysphoric mood. Negative for confusion.        Objective:       There were no vitals taken for this visit.  Physical Exam  Constitutional:       Appearance: Normal appearance. She is well-developed.   HENT:      Head: Normocephalic and atraumatic.   Eyes:      Conjunctiva/sclera: Conjunctivae normal.   Pulmonary:      Effort: Pulmonary effort is normal.   Musculoskeletal:         General: Normal range of motion.      Cervical back: Normal range of motion.   Neurological:      Mental Status: She is alert and oriented to person, place, and time.   Psychiatric:         Mood and Affect: Mood normal.         Assessment:     1. Depression, unspecified depression type    2. Anxiety        Plan:   Depression, unspecified depression type  -     buPROPion (WELLBUTRIN) 75 MG tablet; Take 0.5 tablets (37.5 mg total) by mouth 2 (two) times daily.  Dispense: 30 tablet; Refill: 1    Anxiety      Trial of Wellbutrin.  Consider  increase if no improvement.      Can continue Celexa.    RTC p.r.n.  Medication List with Changes/Refills   New Medications    BUPROPION (WELLBUTRIN) 75 MG TABLET    Take 0.5 tablets (37.5 mg total) by mouth 2 (two) times daily.   Current Medications    ALBUTEROL (VENTOLIN HFA) 90 MCG/ACTUATION INHALER    Inhale 2 puffs into the lungs every 6 (six) hours as needed for Wheezing. Rescue    CITALOPRAM (CELEXA) 40 MG TABLET    Take 1 tablet (40 mg total) by mouth once daily.    IBUPROFEN (ADVIL,MOTRIN) 800 MG TABLET        LIOTHYRONINE (CYTOMEL) 5 MCG TAB    Take 1 tablet (5 mcg total) by mouth once daily.    METFORMIN (GLUCOPHAGE-XR) 500 MG ER 24HR TABLET    Take 500 mg by mouth 2 (two) times daily.    METHEN-M.BLUE-S.PHOS-PHSAL-HYO (URIBEL) 118-10-40.8-36 MG CAP    Take 1 capsule by mouth 3 (three) times daily as needed (bladder pain).    METHOCARBAMOL (ROBAXIN) 750 MG TAB    Take 1 tablet (750 mg total) by mouth nightly as needed.    METRONIDAZOLE (METROGEL) 0.75 % GEL    Apply topically 2 (two) times daily.    PANTOPRAZOLE (PROTONIX) 40 MG TABLET    Take 1 tablet (40 mg total) by mouth once daily.    SYNTHROID 88 MCG TABLET       Discontinued Medications    BUPROPION (WELLBUTRIN XL) 150 MG TB24 TABLET    Take 1 tablet (150 mg total) by mouth once daily.              Disclaimer: This note may have been prepared using voice recognition software, it may have not been extensively proofed, as such there could be errors within the text such as sound alike errors.

## 2024-06-10 DIAGNOSIS — N32.89 BLADDER SPASM: ICD-10-CM

## 2024-06-10 DIAGNOSIS — F32.A DEPRESSION, UNSPECIFIED DEPRESSION TYPE: ICD-10-CM

## 2024-06-10 DIAGNOSIS — J45.20 MILD INTERMITTENT ASTHMA, UNSPECIFIED WHETHER COMPLICATED: ICD-10-CM

## 2024-06-10 DIAGNOSIS — F41.9 ANXIETY: ICD-10-CM

## 2024-06-10 RX ORDER — ALBUTEROL SULFATE 90 UG/1
2 AEROSOL, METERED RESPIRATORY (INHALATION) EVERY 6 HOURS PRN
Qty: 18 G | Refills: 5 | Status: SHIPPED | OUTPATIENT
Start: 2024-06-10

## 2024-06-10 RX ORDER — CITALOPRAM 40 MG/1
40 TABLET, FILM COATED ORAL
Qty: 90 TABLET | Refills: 1 | Status: SHIPPED | OUTPATIENT
Start: 2024-06-10

## 2024-06-10 RX ORDER — BUPROPION HYDROCHLORIDE 75 MG/1
37.5 TABLET ORAL 2 TIMES DAILY
Qty: 30 TABLET | Refills: 3 | Status: SHIPPED | OUTPATIENT
Start: 2024-06-10 | End: 2025-06-10

## 2024-08-06 ENCOUNTER — OFFICE VISIT (OUTPATIENT)
Dept: UROLOGY | Facility: CLINIC | Age: 34
End: 2024-08-06
Payer: MEDICAID

## 2024-08-06 VITALS — WEIGHT: 165 LBS | HEIGHT: 69 IN | BODY MASS INDEX: 24.44 KG/M2

## 2024-08-06 DIAGNOSIS — M06.09 RHEUMATOID ARTHRITIS OF MULTIPLE SITES WITH NEGATIVE RHEUMATOID FACTOR: ICD-10-CM

## 2024-08-06 DIAGNOSIS — N30.10 INTERSTITIAL CYSTITIS: Primary | ICD-10-CM

## 2024-08-06 DIAGNOSIS — N32.89 BLADDER SPASM: ICD-10-CM

## 2024-08-06 PROCEDURE — 3008F BODY MASS INDEX DOCD: CPT | Mod: CPTII,,, | Performed by: NURSE PRACTITIONER

## 2024-08-06 PROCEDURE — 3044F HG A1C LEVEL LT 7.0%: CPT | Mod: CPTII,,, | Performed by: NURSE PRACTITIONER

## 2024-08-06 PROCEDURE — 1159F MED LIST DOCD IN RCRD: CPT | Mod: CPTII,,, | Performed by: NURSE PRACTITIONER

## 2024-08-06 PROCEDURE — 1160F RVW MEDS BY RX/DR IN RCRD: CPT | Mod: CPTII,,, | Performed by: NURSE PRACTITIONER

## 2024-08-06 PROCEDURE — 99213 OFFICE O/P EST LOW 20 MIN: CPT | Mod: ,,, | Performed by: NURSE PRACTITIONER

## 2024-08-06 RX ORDER — METHOCARBAMOL 750 MG/1
750 TABLET, FILM COATED ORAL NIGHTLY PRN
Qty: 30 TABLET | Refills: 3 | Status: SHIPPED | OUTPATIENT
Start: 2024-08-06

## 2024-08-26 ENCOUNTER — PATIENT MESSAGE (OUTPATIENT)
Dept: OBSTETRICS AND GYNECOLOGY | Facility: CLINIC | Age: 34
End: 2024-08-26

## 2024-09-16 ENCOUNTER — OFFICE VISIT (OUTPATIENT)
Dept: OBSTETRICS AND GYNECOLOGY | Facility: CLINIC | Age: 34
End: 2024-09-16
Payer: MEDICAID

## 2024-09-16 VITALS
HEART RATE: 96 BPM | WEIGHT: 160.63 LBS | BODY MASS INDEX: 23.72 KG/M2 | SYSTOLIC BLOOD PRESSURE: 121 MMHG | DIASTOLIC BLOOD PRESSURE: 81 MMHG

## 2024-09-16 DIAGNOSIS — Z01.419 WOMEN'S ANNUAL ROUTINE GYNECOLOGICAL EXAMINATION: Primary | ICD-10-CM

## 2024-09-17 NOTE — PROGRESS NOTES
Subjective:       Patient ID: Kimber Herndon is a 34 y.o. female.    Chief Complaint:  Well Woman      History of Present Illness  Pt here for gyn annual.  History and past labs reviewed with patient.    Complaints none       Review of Systems  Review of Systems   Constitutional:  Negative for chills and fever.   Respiratory:  Negative for shortness of breath.    Cardiovascular:  Negative for chest pain.   Gastrointestinal:  Negative for abdominal pain, blood in stool, constipation, diarrhea, nausea, vomiting and reflux.   Genitourinary:  Negative for dysmenorrhea, dyspareunia, dysuria, hematuria, hot flashes, menorrhagia, menstrual problem, pelvic pain, vaginal bleeding, vaginal discharge, postcoital bleeding and vaginal dryness.   Musculoskeletal:  Negative for arthralgias and joint swelling.   Integumentary:  Negative for rash, hair changes, breast mass, nipple discharge and breast skin changes.   Psychiatric/Behavioral:  Negative for depression. The patient is not nervous/anxious.    Breast: Negative for asymmetry, lump, mass, nipple discharge and skin changes          Objective:     Vitals:    09/16/24 1510   BP: 121/81   Pulse: 96   Weight: 72.8 kg (160 lb 9.6 oz)       Physical Exam:   Constitutional: She appears well-developed and well-nourished. No distress.    HENT:   Head: Normocephalic and atraumatic.    Eyes: Conjunctivae and EOM are normal.    Neck: No tracheal deviation present. No thyromegaly present.    Cardiovascular:       Exam reveals no clubbing, no cyanosis and no edema.        Pulmonary/Chest: Effort normal. No respiratory distress.        Abdominal: Soft. She exhibits no distension and no mass. There is no abdominal tenderness. There is no rebound and no guarding. No hernia.     Genitourinary:    Vagina, uterus and rectum normal.      Pelvic exam was performed with patient supine.   There is no rash, tenderness, lesion or injury on the right labia. There is no rash, tenderness,  lesion or injury on the left labia. Cervix is normal. Right adnexum displays no mass, no tenderness and no fullness. Left adnexum displays no mass, no tenderness and no fullness.                Skin: She is not diaphoretic. No cyanosis. Nails show no clubbing.        breast exam right breast with tender nodule under nipple- no nipple dc, skin changes or lymph nodes palpated bilaterally    Assessment:        1. Women's annual routine gynecological examination                  Plan:      Annual   Pap UTD    STD panel declined   Contraception - tubal ligation/ablation   Risk assessment for inherited gyn cancer done - neg   RTC  1 year     Patient was counseled today on current ASCCP pap guidelines, the recommendation for yearly pelvic exams, healthy diet and exercise routines, annual mammograms starting at age 40, and breast self awareness. She is to see her PCP for other health maintenance

## 2024-09-18 ENCOUNTER — PATIENT MESSAGE (OUTPATIENT)
Dept: FAMILY MEDICINE | Facility: CLINIC | Age: 34
End: 2024-09-18
Payer: MEDICAID

## 2024-09-18 DIAGNOSIS — E89.0 POSTOPERATIVE HYPOTHYROIDISM: Primary | ICD-10-CM

## 2024-09-18 DIAGNOSIS — E88.819 INSULIN RESISTANCE: ICD-10-CM

## 2024-09-18 RX ORDER — LEVOTHYROXINE SODIUM 88 UG/1
88 TABLET ORAL
Qty: 90 TABLET | Refills: 1 | Status: SHIPPED | OUTPATIENT
Start: 2024-09-18

## 2024-09-18 RX ORDER — METFORMIN HYDROCHLORIDE 500 MG/1
500 TABLET, EXTENDED RELEASE ORAL 2 TIMES DAILY
Qty: 180 TABLET | Refills: 1 | Status: SHIPPED | OUTPATIENT
Start: 2024-09-18

## 2024-10-09 DIAGNOSIS — F32.A DEPRESSION, UNSPECIFIED DEPRESSION TYPE: ICD-10-CM

## 2024-10-09 RX ORDER — BUPROPION HYDROCHLORIDE 75 MG/1
37.5 TABLET ORAL 2 TIMES DAILY
Qty: 30 TABLET | Refills: 3 | Status: SHIPPED | OUTPATIENT
Start: 2024-10-09

## 2024-11-18 ENCOUNTER — E-VISIT (OUTPATIENT)
Dept: INTERNAL MEDICINE | Facility: CLINIC | Age: 34
End: 2024-11-18
Payer: MEDICAID

## 2024-11-18 DIAGNOSIS — E89.0 POSTOPERATIVE HYPOTHYROIDISM: ICD-10-CM

## 2024-11-18 DIAGNOSIS — C73 THYROID CANCER: Primary | ICD-10-CM

## 2024-11-18 DIAGNOSIS — F32.A DEPRESSION, UNSPECIFIED DEPRESSION TYPE: ICD-10-CM

## 2024-11-18 RX ORDER — BUPROPION HYDROCHLORIDE 75 MG/1
37.5 TABLET ORAL 2 TIMES DAILY
Qty: 30 TABLET | Refills: 3 | Status: SHIPPED | OUTPATIENT
Start: 2024-11-18

## 2024-11-18 RX ORDER — LIOTHYRONINE SODIUM 5 UG/1
5 TABLET ORAL DAILY
Qty: 5 TABLET | Refills: 0 | OUTPATIENT
Start: 2024-11-18

## 2024-11-18 RX ORDER — LEVOTHYROXINE SODIUM 88 UG/1
88 TABLET ORAL
Qty: 5 TABLET | Refills: 0 | Status: SHIPPED | OUTPATIENT
Start: 2024-11-18

## 2024-11-18 RX ORDER — LIOTHYRONINE SODIUM 5 UG/1
5 TABLET ORAL DAILY
Qty: 5 TABLET | Refills: 0 | Status: SHIPPED | OUTPATIENT
Start: 2024-11-18

## 2024-11-18 NOTE — PROGRESS NOTES
Patient ID: Kimber Herndon is a 34 y.o. female.    Chief Complaint: General Illness (Entered automatically based on patient selection in [employer].)    The patient initiated a request through [employer] on 11/18/2024 for evaluation and management with a chief complaint of General Illness (Entered automatically based on patient selection in [employer].)     I evaluated the questionnaire submission on 11/18/24.    [facility] P[facility] Evisit [facility]-Medication    11/18/2024 11:18 AM CST - Filed by Patient   What do you need help with? Medication Request   Do you agree to participate in an E-Visit? Yes   If you have any of the following symptoms, please present to your local emergency room or call 911:  I acknowledge   Medication requests for narcotics will not be addressed via an E-Visit.  Please schedule an appointment. I acknowledge   Select all that apply: None of the above   Do you want to address a new or existing medication? I would like to address a medication I currently take   What is the main issue you would like addressed today? Family emergency out of town. Need just enough to get by.   Would you like to change or continue your medication? Continue medication   What medication would you like to continue?  Cytomel & welbutrin   Are you taking it as prescribed? Yes    What medical condition is the  medication intended to treat? Depression & thyroid replacement   Is the medication helping your condition? Yes   Are you having any side effects from the medication? No   Provide any additional information you feel is important. Care taking my mom in [place]   Please attach any relevant images or files    Are you able to take your vital signs? No         Encounter Diagnosis   Name Primary?    Thyroid cancer Yes        No orders of the defined types were placed in this encounter.           No follow-ups on file.      E-Visit Time Tracking:    Day 1 Time (in minutes): 5    Total Time (in minutes): 5

## 2024-12-09 DIAGNOSIS — M06.09 RHEUMATOID ARTHRITIS OF MULTIPLE SITES WITH NEGATIVE RHEUMATOID FACTOR: ICD-10-CM

## 2024-12-09 RX ORDER — METHOCARBAMOL 750 MG/1
750 TABLET, FILM COATED ORAL NIGHTLY PRN
Qty: 30 TABLET | Refills: 3 | Status: SHIPPED | OUTPATIENT
Start: 2024-12-09

## 2024-12-17 ENCOUNTER — E-VISIT (OUTPATIENT)
Dept: FAMILY MEDICINE | Facility: CLINIC | Age: 34
End: 2024-12-17
Payer: MEDICAID

## 2024-12-17 DIAGNOSIS — E89.0 POSTOPERATIVE HYPOTHYROIDISM: Primary | ICD-10-CM

## 2024-12-18 DIAGNOSIS — F32.A DEPRESSION, UNSPECIFIED DEPRESSION TYPE: ICD-10-CM

## 2024-12-18 RX ORDER — BUPROPION HYDROCHLORIDE 75 MG/1
37.5 TABLET ORAL 2 TIMES DAILY
Qty: 30 TABLET | Refills: 3 | Status: SHIPPED | OUTPATIENT
Start: 2024-12-18

## 2024-12-18 RX ORDER — LEVOTHYROXINE SODIUM 88 UG/1
88 TABLET ORAL
Qty: 90 TABLET | Refills: 1 | Status: SHIPPED | OUTPATIENT
Start: 2024-12-18

## 2024-12-18 NOTE — PROGRESS NOTES
Patient ID: Kimber Herndon is a 34 y.o. female.    Chief Complaint: General Illness (Entered automatically based on patient selection in [employer].)    The patient initiated a request through [employer] on 12/17/2024 for evaluation and management with a chief complaint of General Illness (Entered automatically based on patient selection in [employer].)     I evaluated the questionnaire submission on 12/18/2024.    [facility] P[facility] Evisit [facility]-Medication    12/17/2024  7:22 PM CST - Filed by Patient   What do you need help with? Medication Management   Do you agree to participate in an E-Visit? Yes   If you have any of the following symptoms, please present to your local emergency room or call 911:  I acknowledge   Medication requests for narcotics will not be addressed via an E-Visit.  Please schedule an appointment. I acknowledge   Do you have any of the following pregnancy-related conditions? None   Do you want to address a new or existing medication? I would like to address a medication I currently take   What is the main issue you would like addressed today? SYNTHROID not ahowing on med list. Not showing as active at pharmacy.   Would you like to change or continue your medication? Continue medication   What medication would you like to continue?  SYNTHROID(brand) 88mcg   Are you taking it as prescribed? Yes    What medical condition is the  medication intended to treat? No thyroid   Is the medication helping your condition? Yes   Are you having any side effects from the medication? No   Provide any additional information you feel is important. Meds missing on list & pharmacy missing scripts   Please attach any relevant images or files    Are you able to take your vital signs? No         Encounter Diagnosis   Name Primary?    Postoperative hypothyroidism Yes        No orders of the defined types were placed in this encounter.     Medications Ordered This Encounter   Medications    SYNTHROID 88 mcg tablet     Sig:  Take 1 tablet (88 mcg total) by mouth before breakfast.     Dispense:  90 tablet     Refill:  1        No follow-ups on file.      E-Visit Time Tracking:    Day 1 Time (in minutes): 6    Total Time (in minutes): 6

## 2025-01-06 ENCOUNTER — E-VISIT (OUTPATIENT)
Dept: FAMILY MEDICINE | Facility: CLINIC | Age: 35
End: 2025-01-06
Payer: MEDICAID

## 2025-01-06 DIAGNOSIS — D22.9 BENIGN PIGMENTED MOLE: Primary | ICD-10-CM

## 2025-01-06 PROCEDURE — 99499 UNLISTED E&M SERVICE: CPT | Mod: ,,, | Performed by: STUDENT IN AN ORGANIZED HEALTH CARE EDUCATION/TRAINING PROGRAM

## 2025-02-20 DIAGNOSIS — F41.9 ANXIETY: ICD-10-CM

## 2025-02-21 RX ORDER — CITALOPRAM 40 MG/1
40 TABLET, FILM COATED ORAL
Qty: 90 TABLET | Refills: 0 | Status: SHIPPED | OUTPATIENT
Start: 2025-02-21

## 2025-03-16 DIAGNOSIS — E88.819 INSULIN RESISTANCE: ICD-10-CM

## 2025-03-17 RX ORDER — METFORMIN HYDROCHLORIDE 500 MG/1
500 TABLET, EXTENDED RELEASE ORAL 2 TIMES DAILY
Qty: 60 TABLET | Refills: 1 | Status: SHIPPED | OUTPATIENT
Start: 2025-03-17

## 2025-04-15 ENCOUNTER — OFFICE VISIT (OUTPATIENT)
Dept: FAMILY MEDICINE | Facility: CLINIC | Age: 35
End: 2025-04-15
Payer: MEDICAID

## 2025-04-15 VITALS
RESPIRATION RATE: 18 BRPM | BODY MASS INDEX: 24.44 KG/M2 | SYSTOLIC BLOOD PRESSURE: 110 MMHG | OXYGEN SATURATION: 98 % | WEIGHT: 165 LBS | HEART RATE: 95 BPM | DIASTOLIC BLOOD PRESSURE: 78 MMHG | HEIGHT: 69 IN

## 2025-04-15 DIAGNOSIS — E89.0 POSTOPERATIVE HYPOTHYROIDISM: ICD-10-CM

## 2025-04-15 DIAGNOSIS — Z85.850 HISTORY OF THYROID CANCER: ICD-10-CM

## 2025-04-15 DIAGNOSIS — E88.819 INSULIN RESISTANCE: ICD-10-CM

## 2025-04-15 DIAGNOSIS — Z79.899 ON LONG TERM DRUG THERAPY: ICD-10-CM

## 2025-04-15 DIAGNOSIS — F32.A DEPRESSION, UNSPECIFIED DEPRESSION TYPE: ICD-10-CM

## 2025-04-15 DIAGNOSIS — D22.9 NEVUS: Primary | ICD-10-CM

## 2025-04-15 DIAGNOSIS — F41.9 ANXIETY: ICD-10-CM

## 2025-04-15 DIAGNOSIS — N30.10 IC (INTERSTITIAL CYSTITIS): ICD-10-CM

## 2025-04-15 DIAGNOSIS — F10.11 HISTORY OF ETOH ABUSE: ICD-10-CM

## 2025-04-15 DIAGNOSIS — M06.09 RHEUMATOID ARTHRITIS OF MULTIPLE SITES WITH NEGATIVE RHEUMATOID FACTOR: ICD-10-CM

## 2025-04-15 PROCEDURE — 99214 OFFICE O/P EST MOD 30 MIN: CPT | Mod: S$GLB,,, | Performed by: STUDENT IN AN ORGANIZED HEALTH CARE EDUCATION/TRAINING PROGRAM

## 2025-04-15 PROCEDURE — 1159F MED LIST DOCD IN RCRD: CPT | Mod: CPTII,S$GLB,, | Performed by: STUDENT IN AN ORGANIZED HEALTH CARE EDUCATION/TRAINING PROGRAM

## 2025-04-15 PROCEDURE — 3074F SYST BP LT 130 MM HG: CPT | Mod: CPTII,S$GLB,, | Performed by: STUDENT IN AN ORGANIZED HEALTH CARE EDUCATION/TRAINING PROGRAM

## 2025-04-15 PROCEDURE — 3078F DIAST BP <80 MM HG: CPT | Mod: CPTII,S$GLB,, | Performed by: STUDENT IN AN ORGANIZED HEALTH CARE EDUCATION/TRAINING PROGRAM

## 2025-04-15 PROCEDURE — 3008F BODY MASS INDEX DOCD: CPT | Mod: CPTII,S$GLB,, | Performed by: STUDENT IN AN ORGANIZED HEALTH CARE EDUCATION/TRAINING PROGRAM

## 2025-04-15 RX ORDER — BUPROPION HYDROCHLORIDE 75 MG/1
37.5 TABLET ORAL 2 TIMES DAILY
Qty: 30 TABLET | Refills: 3 | Status: SHIPPED | OUTPATIENT
Start: 2025-04-15

## 2025-04-15 RX ORDER — CITALOPRAM 40 MG/1
40 TABLET, FILM COATED ORAL DAILY
Qty: 90 TABLET | Refills: 1 | Status: SHIPPED | OUTPATIENT
Start: 2025-04-15

## 2025-04-15 NOTE — PROGRESS NOTES
Subjective:      Patient ID: Kimber Herndon is a 34 y.o. female.    Chief Complaint: Biopsy, Abdominal Pain (Check up), and Referral (Rheumatology, not Cresencio Onofre)      HPI:  34-year-old female presents today with multiple complaints.  Patient states she would like a spot checked on her nose.  Has a darker lesion on the left side of her nose.  Slightly rough to the touch.  Has been there for years.  Has not seem to change in size.  Does not bother her.  Has not had this checked by a dermatologist.  She would also like to follow-up with Rheumatology.  States she recently had a positive LELE through her hematologist.  Has a history of arthritis and interstitial cystitis.  She is concerned she may have Lina-Danlos syndrome.  Has a family history of similar symptoms.  She is ready for blood work today.  No longer following with Endocrinology.  Thyroid meds have been stable for years.  States hematology is watching her white count currently.  Unsure if she needs refills on her medications at this time.  No other acute complaints.    Past Medical History:   Diagnosis Date    Anxiety     Cancer     thyroid     Depression     Hypothyroidism      Past Surgical History:   Procedure Laterality Date    ABLATION, FIBROID, UTERUS, LAPAROSCOPIC, WITH US GUIDANCE      MOUTH SURGERY      SPINE SURGERY  07/07/2020    T4-L3 PSF w/ SUBLAMINAR BANDS & SPOs for scoli correction, 7/7/2020, Dr. Jimmy REDDING.    TOTAL THYROIDECTOMY      TUBAL LIGATION      bilateral tubes removed      Family History   Problem Relation Name Age of Onset    Arthritis Mother Ann Marie     Autoimmune disease Mother Ann Marie     Lupus Mother Ann Marie     Hypothyroidism Mother Ann Marie     Asthma Mother Ann Marie     Learning disabilities Mother Ann Marie     Hypertension Father Humberto     Alcohol abuse Father Humberto     Hypothyroidism Father Humberto     Asthma Father Humberto     Depression Father Humberto     Diabetes Father Humberto     Hearing loss Father Humberto      "Learning disabilities Father Humberto     Mental illness Father Humberto     Hypothyroidism Sister      Fibromyalgia Son Laura     Asthma Son Laura     Stroke Maternal Grandfather Giacomo ojeda     Heart disease Maternal Grandfather Giacomo ojeda     Alcohol abuse Maternal Grandfather Giacomo ojeda     COPD Maternal Grandfather Giacomo ojeda     Hypertension Maternal Grandfather Giacomo ojeda     Cancer Paternal Grandmother Elly     Alcohol abuse Paternal Uncle Definitely     Alcohol abuse Paternal Uncle Definitely      Social History[1]  Review of patient's allergies indicates:   Allergen Reactions    Codeine Anaphylaxis    Aloe vera     Lactose     Latex, natural rubber        Review of Systems   Constitutional:  Negative for activity change, appetite change, fatigue and unexpected weight change.   HENT:  Negative for hearing loss, sinus pain and trouble swallowing.    Eyes:  Negative for discharge and visual disturbance.   Respiratory:  Negative for cough, chest tightness, shortness of breath and wheezing.    Cardiovascular:  Negative for chest pain and palpitations.   Gastrointestinal:  Positive for constipation. Negative for abdominal pain, blood in stool, diarrhea, nausea and vomiting.   Endocrine: Negative for polydipsia and polyuria.   Genitourinary:  Positive for menstrual problem. Negative for difficulty urinating, dysuria and hematuria.   Musculoskeletal:  Negative for arthralgias, joint swelling, myalgias and neck pain.   Skin:  Positive for color change.   Neurological:  Negative for dizziness, weakness and headaches.   Psychiatric/Behavioral:  Negative for confusion and dysphoric mood. The patient is not nervous/anxious.        Objective:       /78 (BP Location: Left arm, Patient Position: Sitting)   Pulse 95   Resp 18   Ht 5' 9" (1.753 m)   Wt 74.8 kg (165 lb)   SpO2 98%   BMI 24.37 kg/m²   Physical Exam  Vitals and nursing note reviewed.   Constitutional:       Appearance: " Normal appearance. She is well-developed.   HENT:      Head: Normocephalic and atraumatic.   Eyes:      Extraocular Movements: Extraocular movements intact.      Conjunctiva/sclera: Conjunctivae normal.      Pupils: Pupils are equal, round, and reactive to light.   Cardiovascular:      Rate and Rhythm: Normal rate and regular rhythm.   Pulmonary:      Effort: Pulmonary effort is normal.   Abdominal:      Palpations: Abdomen is soft.   Musculoskeletal:         General: Normal range of motion.      Cervical back: Normal range of motion and neck supple.   Skin:     General: Skin is warm and dry.      Findings: Lesion present.             Comments: Less than 1 cm hyperpigmented lesion noted to left side of nose.  Slightly rough to the touch.   Neurological:      General: No focal deficit present.      Mental Status: She is alert and oriented to person, place, and time.   Psychiatric:         Mood and Affect: Mood normal.         Assessment:     1. Nevus    2. Depression, unspecified depression type    3. Anxiety    4. Insulin resistance    5. Postoperative hypothyroidism    6. Rheumatoid arthritis of multiple sites with negative rheumatoid factor    7. IC (interstitial cystitis)    8. History of ETOH abuse    9. History of thyroid cancer    10. On long term drug therapy        Plan:   Nevus  -     Ambulatory referral/consult to Dermatology; Future; Expected date: 04/22/2025    Depression, unspecified depression type  -     buPROPion (WELLBUTRIN) 75 MG tablet; Take 0.5 tablets (37.5 mg total) by mouth 2 (two) times daily.  Dispense: 30 tablet; Refill: 3    Anxiety  -     citalopram (CELEXA) 40 MG tablet; Take 1 tablet (40 mg total) by mouth once daily.  Dispense: 90 tablet; Refill: 1    Insulin resistance  -     Hemoglobin A1C; Future; Expected date: 04/15/2025    Postoperative hypothyroidism  -     T4, Free; Future; Expected date: 04/15/2025  -     TSH; Future; Expected date: 04/15/2025    Rheumatoid arthritis of  multiple sites with negative rheumatoid factor  -     Ambulatory referral/consult to Rheumatology; Future; Expected date: 04/22/2025    IC (interstitial cystitis)    History of ETOH abuse    History of thyroid cancer  -     T4, Free; Future; Expected date: 04/15/2025  -     TSH; Future; Expected date: 04/15/2025    On long term drug therapy  -     CBC Auto Differential; Future; Expected date: 04/15/2025  -     Comprehensive Metabolic Panel; Future; Expected date: 04/15/2025  -     Hemoglobin A1C; Future; Expected date: 04/15/2025  -     Lipid Panel; Future; Expected date: 04/15/2025  -     T4, Free; Future; Expected date: 04/15/2025  -     TSH; Future; Expected date: 04/15/2025      Dermatology referral pending.      Rheumatology referral pending.      Repeat labs pending.      Congratulated on sobriety.      Can call for refills when needed.    Continue follow-up with gynecology.    RTC in 1 year.  Sooner if needed.  Medication List with Changes/Refills   Current Medications    ALBUTEROL (VENTOLIN HFA) 90 MCG/ACTUATION INHALER    Inhale 2 puffs into the lungs every 6 (six) hours as needed for Wheezing. Rescue    LIOTHYRONINE (CYTOMEL) 5 MCG TAB    Take 1 tablet (5 mcg total) by mouth once daily.    METFORMIN (GLUCOPHAGE-XR) 500 MG ER 24HR TABLET    TAKE 1 TABLET BY MOUTH TWICE A DAY    METHEN-M.BLUE-S.PHOS-PHSAL-HYO (URIBEL) 118-10-40.8-36 MG CAP    Take 1 capsule by mouth 3 (three) times daily as needed (bladder pain).    METHOCARBAMOL (ROBAXIN) 750 MG TAB    TAKE 1 TABLET BY MOUTH NIGHTLY AS NEEDED.    SYNTHROID 88 MCG TABLET    Take 1 tablet (88 mcg total) by mouth before breakfast.   Changed and/or Refilled Medications    Modified Medication Previous Medication    BUPROPION (WELLBUTRIN) 75 MG TABLET buPROPion (WELLBUTRIN) 75 MG tablet       Take 0.5 tablets (37.5 mg total) by mouth 2 (two) times daily.    Take 0.5 tablets (37.5 mg total) by mouth 2 (two) times daily.    CITALOPRAM (CELEXA) 40 MG TABLET  citalopram (CELEXA) 40 MG tablet       Take 1 tablet (40 mg total) by mouth once daily.    TAKE 1 TABLET BY MOUTH EVERY DAY              Disclaimer: This note may have been prepared using voice recognition software, it may have not been extensively proofed, as such there could be errors within the text such as sound alike errors.          [1]   Social History  Socioeconomic History    Marital status: Significant Other   Tobacco Use    Smoking status: Former     Current packs/day: 0.00     Types: Cigarettes, Vaping with nicotine     Quit date:      Years since quittin.2    Smokeless tobacco: Never   Substance and Sexual Activity    Alcohol use: Not Currently     Comment: 9 YEARS SOBER!    Drug use: Never    Sexual activity: Yes     Partners: Male     Birth control/protection: Other-see comments, None     Comment: Uterine ablation & tubal ligation     Social Drivers of Health     Financial Resource Strain: Low Risk  (2024)    Overall Financial Resource Strain (CARDIA)     Difficulty of Paying Living Expenses: Not very hard   Food Insecurity: Patient Declined (2024)    Hunger Vital Sign     Worried About Running Out of Food in the Last Year: Patient declined     Ran Out of Food in the Last Year: Patient declined   Transportation Needs: No Transportation Needs (2024)    PRAPARE - Transportation     Lack of Transportation (Medical): No     Lack of Transportation (Non-Medical): No   Physical Activity: Unknown (2024)    Exercise Vital Sign     Days of Exercise per Week: Patient declined   Stress: Stress Concern Present (2024)    [employer] [place] [employer] - Occupational Stress Questionnaire     Feeling of Stress : Very much   Housing Stability: Unknown (2024)    Housing Stability Vital Sign     Unable to Pay for Housing in the Last Year: No

## 2025-04-16 LAB
ABS NRBC COUNT: 0 X 10 3/UL (ref 0–0.01)
ABSOLUTE BASOPHIL: 0.02 X 10 3/UL (ref 0–0.22)
ABSOLUTE EOSINOPHIL: 0.09 X 10 3/UL (ref 0.04–0.54)
ABSOLUTE IMMATURE GRAN: 0.06 X 10 3/UL (ref 0–0.04)
ABSOLUTE LYMPHOCYTE: 1.14 X 10 3/UL (ref 0.86–4.75)
ABSOLUTE MONOCYTE: 0.44 X 10 3/UL (ref 0.22–1.08)
ALBUMIN SERPL-MCNC: 4.5 G/DL (ref 3.5–5.2)
ALBUMIN/GLOB SERPL ELPH: 2.3 {RATIO} (ref 1–2.7)
ALP ISOS SERPL LEV INH-CCNC: 76 U/L (ref 35–105)
ALT (SGPT): 61 U/L (ref 0–33)
ANION GAP SERPL CALC-SCNC: 10 MMOL/L (ref 8–17)
AST SERPL-CCNC: 43 U/L (ref 0–32)
BASOPHILS NFR BLD: 0.5 % (ref 0.2–1.2)
BILIRUBIN, TOTAL: 0.3 MG/DL (ref 0–1.2)
BUN/CREAT SERPL: 25.3 (ref 6–20)
CALCIUM SERPL-MCNC: 9 MG/DL (ref 8.6–10.2)
CARBON DIOXIDE, CO2: 28 MMOL/L (ref 22–29)
CHLORIDE: 103 MMOL/L (ref 98–107)
CHOLEST SERPL-MCNC: 53 MG/DL (ref 0–150)
CHOLEST SERPL-MSCNC: 173 MG/DL (ref 100–200)
CREAT SERPL-MCNC: 0.51 MG/DL (ref 0.5–0.9)
EOSINOPHIL NFR BLD: 2.3 % (ref 0.7–7)
ESTIMATED AVERAGE GLUCOSE: 97 MG/DL (ref 70–126)
GFR ESTIMATION: 124.76 ML/MIN/1.73M2
GLOBULIN: 2 G/DL (ref 1.5–4.5)
GLUCOSE: 65 MG/DL (ref 74–106)
HBA1C MFR BLD: 5 % (ref 4–5.6)
HCT VFR BLD AUTO: 38.8 % (ref 37–47)
HDLC SERPL-MCNC: 68 MG/DL
HGB BLD-MCNC: 12.4 G/DL (ref 12–16)
IMMATURE GRANULOCYTES: 1.6 % (ref 0–0.5)
LDL/HDL RATIO: 1.4 (ref 1–3)
LDLC SERPL CALC-MCNC: 94.4 MG/DL (ref 0–100)
LYMPHOCYTES NFR BLD: 29.5 % (ref 19.3–53.1)
MCH RBC QN AUTO: 30 PG (ref 27–32)
MCHC RBC AUTO-ENTMCNC: 32 G/DL (ref 32–36)
MCV RBC AUTO: 93.9 FL (ref 82–100)
MONOCYTES NFR BLD: 11.4 % (ref 4.7–12.5)
NEUTROPHILS # BLD AUTO: 2.12 X 10 3/UL (ref 2.15–7.56)
NEUTROPHILS NFR BLD: 54.7 % (ref 34–71.1)
NUCLEATED RED BLOOD CELLS: 0 /100 WBC (ref 0–0.2)
PLATELET # BLD AUTO: 218 X 10 3/UL (ref 135–400)
POTASSIUM: 4.5 MMOL/L (ref 3.5–5.1)
PROT SNV-MCNC: 6.5 G/DL (ref 6.4–8.3)
RBC # BLD AUTO: 4.13 X 10 6/UL (ref 4.2–5.4)
RDW-SD: 39.8 FL (ref 37–54)
SODIUM: 141 MMOL/L (ref 136–145)
T4, FREE: 1.04 NG/DL (ref 0.93–1.7)
TSH SERPL DL<=0.005 MIU/L-ACNC: 0.47 UIU/ML (ref 0.27–4.2)
UREA NITROGEN (BUN): 12.9 MG/DL (ref 6–20)
WBC # BLD: 3.87 X 10 3/UL (ref 4.3–10.8)

## 2025-04-17 ENCOUNTER — RESULTS FOLLOW-UP (OUTPATIENT)
Dept: FAMILY MEDICINE | Facility: CLINIC | Age: 35
End: 2025-04-17

## 2025-05-13 DIAGNOSIS — E88.819 INSULIN RESISTANCE: ICD-10-CM

## 2025-05-13 RX ORDER — METFORMIN HYDROCHLORIDE 500 MG/1
500 TABLET, EXTENDED RELEASE ORAL 2 TIMES DAILY
Qty: 180 TABLET | Refills: 1 | Status: SHIPPED | OUTPATIENT
Start: 2025-05-13

## 2025-06-17 ENCOUNTER — OFFICE VISIT (OUTPATIENT)
Dept: FAMILY MEDICINE | Facility: CLINIC | Age: 35
End: 2025-06-17
Payer: MEDICAID

## 2025-06-17 DIAGNOSIS — D72.819 LEUKOPENIA, UNSPECIFIED TYPE: ICD-10-CM

## 2025-06-17 DIAGNOSIS — J45.20 MILD INTERMITTENT ASTHMA, UNSPECIFIED WHETHER COMPLICATED: Primary | ICD-10-CM

## 2025-06-17 DIAGNOSIS — Z85.850 HISTORY OF THYROID CANCER: ICD-10-CM

## 2025-06-17 DIAGNOSIS — K58.1 IRRITABLE BOWEL SYNDROME WITH CONSTIPATION: ICD-10-CM

## 2025-06-17 DIAGNOSIS — M06.09 RHEUMATOID ARTHRITIS OF MULTIPLE SITES WITH NEGATIVE RHEUMATOID FACTOR: ICD-10-CM

## 2025-06-17 DIAGNOSIS — N30.10 IC (INTERSTITIAL CYSTITIS): ICD-10-CM

## 2025-06-17 DIAGNOSIS — F41.9 ANXIETY: ICD-10-CM

## 2025-06-17 PROCEDURE — 3044F HG A1C LEVEL LT 7.0%: CPT | Mod: CPTII,95,, | Performed by: STUDENT IN AN ORGANIZED HEALTH CARE EDUCATION/TRAINING PROGRAM

## 2025-06-17 PROCEDURE — 98004 SYNCH AUDIO-VIDEO EST SF 10: CPT | Mod: 95,,, | Performed by: STUDENT IN AN ORGANIZED HEALTH CARE EDUCATION/TRAINING PROGRAM

## 2025-06-17 NOTE — PROGRESS NOTES
The patient location is: [place]  The chief complaint leading to consultation is:  Follow-up of chronic medical conditions    Visit type: audiovisual    Face to Face time with patient:  9 minute  12 minutes of total time spent on the encounter, which includes face to face time and non-face to face time preparing to see the patient (eg, review of tests), Obtaining and/or reviewing separately obtained history, Documenting clinical information in the electronic or other health record, Independently interpreting results (not separately reported) and communicating results to the patient/family/caregiver, or Care coordination (not separately reported).         Each patient to whom he or she provides medical services by telemedicine is:  (1) informed of the relationship between the physician and patient and the respective role of any other health care provider with respect to management of the patient; and (2) notified that he or she may decline to receive medical services by telemedicine and may withdraw from such care at any time.    Notes:  34-year-old female presents today for follow-up of chronic medical conditions.  Does not need refills on her medications at this time.  She is currently filing for disability.  States they are needing records of her chronic conditions.  Discussed with patient that we do not complete disability paperwork.  They are able to request records.  She does follow-up with Heme-Onc for leukopenia and her history of thyroid cancer.  Follows up with GI for IBS with constipation.  She is currently looking for rheumatologist.  Has not been able to find 1 that accepts her insurance.  She will let us know where she would like her referral sent.

## 2025-07-07 ENCOUNTER — OFFICE VISIT (OUTPATIENT)
Dept: OBSTETRICS AND GYNECOLOGY | Facility: CLINIC | Age: 35
End: 2025-07-07
Payer: MEDICAID

## 2025-07-07 VITALS
DIASTOLIC BLOOD PRESSURE: 71 MMHG | HEART RATE: 96 BPM | BODY MASS INDEX: 23.3 KG/M2 | WEIGHT: 157.81 LBS | SYSTOLIC BLOOD PRESSURE: 105 MMHG

## 2025-07-07 DIAGNOSIS — N93.9 ABNORMAL UTERINE BLEEDING (AUB): Primary | ICD-10-CM

## 2025-07-07 PROCEDURE — 1159F MED LIST DOCD IN RCRD: CPT | Mod: CPTII,,, | Performed by: OBSTETRICS & GYNECOLOGY

## 2025-07-07 PROCEDURE — 3074F SYST BP LT 130 MM HG: CPT | Mod: CPTII,,, | Performed by: OBSTETRICS & GYNECOLOGY

## 2025-07-07 PROCEDURE — 3008F BODY MASS INDEX DOCD: CPT | Mod: CPTII,,, | Performed by: OBSTETRICS & GYNECOLOGY

## 2025-07-07 PROCEDURE — 99214 OFFICE O/P EST MOD 30 MIN: CPT | Mod: S$PBB,,, | Performed by: OBSTETRICS & GYNECOLOGY

## 2025-07-07 PROCEDURE — 3078F DIAST BP <80 MM HG: CPT | Mod: CPTII,,, | Performed by: OBSTETRICS & GYNECOLOGY

## 2025-07-07 PROCEDURE — 3044F HG A1C LEVEL LT 7.0%: CPT | Mod: CPTII,,, | Performed by: OBSTETRICS & GYNECOLOGY

## 2025-07-09 NOTE — PROGRESS NOTES
Subjective:       Patient ID: Kimber Herndon is a 34 y.o. female.    Chief Complaint:  AUB      History of Present Illness  Pt here for AUB  History and past labs reviewed with patient.    Complaints of painful cycles and irregular bleeding. Worsening with time since ablation        Review of Systems  Review of Systems   Constitutional:  Negative for chills and fever.   Respiratory:  Negative for shortness of breath.    Cardiovascular:  Negative for chest pain.   Gastrointestinal:  Negative for abdominal pain, blood in stool, constipation, diarrhea, nausea, vomiting and reflux.   Genitourinary:  Negative for dysmenorrhea, dyspareunia, dysuria, hematuria, hot flashes, menorrhagia, menstrual problem, pelvic pain, vaginal bleeding, vaginal discharge, postcoital bleeding and vaginal dryness.   Musculoskeletal:  Negative for arthralgias and joint swelling.   Integumentary:  Negative for rash, hair changes, breast mass, nipple discharge and breast skin changes.   Psychiatric/Behavioral:  Negative for depression. The patient is not nervous/anxious.    Breast: Negative for asymmetry, lump, mass, nipple discharge and skin changes          Objective:     Vitals:    07/07/25 1501   BP: 105/71   Pulse: 96   Weight: 71.6 kg (157 lb 12.8 oz)       Physical Exam:   Constitutional: She appears well-developed and well-nourished. No distress.    HENT:   Head: Normocephalic and atraumatic.    Eyes: Conjunctivae and EOM are normal.    Neck: No tracheal deviation present. No thyromegaly present.    Cardiovascular:       Exam reveals no clubbing, no cyanosis and no edema.        Pulmonary/Chest: Effort normal. No respiratory distress.        Abdominal: Soft. She exhibits no distension and no mass. There is no abdominal tenderness. There is no rebound and no guarding. No hernia.     Genitourinary:    Vagina, uterus and rectum normal.      Pelvic exam was performed with patient supine.   There is no rash, tenderness, lesion  or injury on the right labia. There is no rash, tenderness, lesion or injury on the left labia. Cervix is normal. Right adnexum displays no mass, no tenderness and no fullness. Left adnexum displays no mass, no tenderness and no fullness.                Skin: She is not diaphoretic. No cyanosis. Nails show no clubbing.          Assessment:        1. Abnormal uterine bleeding (AUB)               Plan:      Discussed failed ablation   Pt desires definitive management   Plan for TLH, cysto   RTC for preop

## 2025-07-10 DIAGNOSIS — N93.9 ABNORMAL UTERINE BLEEDING (AUB): Primary | ICD-10-CM

## 2025-07-10 LAB — Lab: NORMAL

## 2025-07-12 DIAGNOSIS — E89.0 POSTOPERATIVE HYPOTHYROIDISM: ICD-10-CM

## 2025-07-14 RX ORDER — LEVOTHYROXINE SODIUM 88 UG/1
88 TABLET ORAL
Qty: 90 TABLET | Refills: 1 | Status: SHIPPED | OUTPATIENT
Start: 2025-07-14

## 2025-07-28 ENCOUNTER — OFFICE VISIT (OUTPATIENT)
Dept: OBSTETRICS AND GYNECOLOGY | Facility: CLINIC | Age: 35
End: 2025-07-28
Payer: MEDICAID

## 2025-07-28 VITALS
HEART RATE: 82 BPM | DIASTOLIC BLOOD PRESSURE: 74 MMHG | SYSTOLIC BLOOD PRESSURE: 106 MMHG | BODY MASS INDEX: 22.68 KG/M2 | WEIGHT: 153.63 LBS

## 2025-07-28 DIAGNOSIS — N93.9 ABNORMAL UTERINE BLEEDING (AUB): Primary | ICD-10-CM

## 2025-07-28 PROCEDURE — 99499 UNLISTED E&M SERVICE: CPT | Mod: S$PBB,,, | Performed by: OBSTETRICS & GYNECOLOGY

## 2025-07-28 NOTE — PROGRESS NOTES
35 y.o. No obstetric history on file. presents for pre-op H&P for TLH, BSO, cysto.  No LMP recorded. Patient has had an ablation..    Past Medical History:   Diagnosis Date    Anxiety     Cancer     thyroid     Depression     Hypothyroidism      Past Surgical History:   Procedure Laterality Date    ABLATION, FIBROID, UTERUS, LAPAROSCOPIC, WITH US GUIDANCE      MOUTH SURGERY      SPINE SURGERY  07/07/2020    T4-L3 PSF w/ SUBLAMINAR BANDS & SPOs for scoli correction, 7/7/2020, [facility], Dr. Marquez.    TOTAL THYROIDECTOMY      TUBAL LIGATION      bilateral tubes removed      Family History   Problem Relation Name Age of Onset    Arthritis Mother Ann Marie     Autoimmune disease Mother Ann Marie     Lupus Mother Ann Marie     Hypothyroidism Mother Ann Marie     Asthma Mother Ann Marie     Learning disabilities Mother Ann Marie     Hypertension Father Humberto     Alcohol abuse Father Humberto     Hypothyroidism Father Humberto     Asthma Father Humberto     Depression Father Humberto     Diabetes Father Humberto     Hearing loss Father Humberto     Learning disabilities Father Humberto     Mental illness Father Humberto     Hypothyroidism Sister      Fibromyalgia Son Laura     Asthma Son Laura     Stroke Maternal Grandfather Giacomo ojeda     Heart disease Maternal Grandfather Giacomonaawf ojeda     Alcohol abuse Maternal Grandfather Giacomonawaf ojeda     COPD Maternal Grandfather Giacomonawaf ojeda     Hypertension Maternal Grandfather Giacomonawaf ojeda     Cancer Paternal Grandmother Elly     Alcohol abuse Paternal Uncle Definitely     Alcohol abuse Paternal Uncle Definitely      Review of patient's allergies indicates:   Allergen Reactions    Codeine Anaphylaxis    Aloe vera     Lactose     Latex, natural rubber      Current Medications[1]  Social History[2]    Review of Systems   Constitutional:  Negative for chills and fever.   Respiratory:  Negative for shortness of breath.    Cardiovascular:  Negative for chest pain.   Gastrointestinal:  Negative for  abdominal pain, blood in stool, constipation, diarrhea, nausea, vomiting and reflux.   Genitourinary:  Positive for menorrhagia and menstrual problem. Negative for dysmenorrhea, dyspareunia, dysuria, hematuria, hot flashes, pelvic pain, vaginal bleeding, vaginal discharge, postcoital bleeding and vaginal dryness.   Musculoskeletal:  Negative for arthralgias and joint swelling.   Integumentary:  Negative for rash, hair changes, breast mass, nipple discharge and breast skin changes.   Psychiatric/Behavioral:  Negative for depression. The patient is not nervous/anxious.    Breast: Negative for asymmetry, lump, mass, nipple discharge and skin changes      Vitals:    07/28/25 1302   BP: 106/74   Pulse: 82       Physical Exam:   Constitutional: She is oriented to person, place, and time. She appears well-developed and well-nourished.    HENT:   Head: Normocephalic and atraumatic.    Eyes: Conjunctivae are normal. No scleral icterus.    Neck: No thyromegaly present.    Cardiovascular:  Normal rate, regular rhythm and normal heart sounds.      Exam reveals no clubbing, no cyanosis and no edema.        Pulmonary/Chest: Effort normal and breath sounds normal. No respiratory distress.        Abdominal: Soft. She exhibits no distension. No hernia.             Musculoskeletal: Normal range of motion and moves all extremeties.       Neurological: She is alert and oriented to person, place, and time.    Skin: Skin is warm and dry. No cyanosis. Nails show no clubbing.    Psychiatric: She has a normal mood and affect. Her behavior is normal.         Assessment: Menorrhagia, dysmenorrhea, pelvic pain     Plan: to OR for TLH, BSO   Discussed risks of BSO and pt would like to proceed   I have discussed the risks, benefits, indications, and alternatives of the procedure in detail.  The patient verbalizes her understanding.  All questions answered.  Consents signed.  The patient agrees to proceed to proceed as planned.       [1]    Current Outpatient Medications:     buPROPion (WELLBUTRIN) 75 MG tablet, Take 0.5 tablets (37.5 mg total) by mouth 2 (two) times daily., Disp: 30 tablet, Rfl: 3    citalopram (CELEXA) 40 MG tablet, Take 1 tablet (40 mg total) by mouth once daily., Disp: 90 tablet, Rfl: 1    liothyronine (CYTOMEL) 5 MCG Tab, Take 1 tablet (5 mcg total) by mouth once daily., Disp: 5 tablet, Rfl: 0    metFORMIN (GLUCOPHAGE-XR) 500 MG ER 24hr tablet, TAKE 1 TABLET BY MOUTH TWICE A DAY, Disp: 180 tablet, Rfl: 1    SYNTHROID 88 mcg tablet, Take 1 tablet (88 mcg total) by mouth before breakfast., Disp: 90 tablet, Rfl: 1    albuterol (VENTOLIN HFA) 90 mcg/actuation inhaler, Inhale 2 puffs into the lungs every 6 (six) hours as needed for Wheezing. Rescue (Patient not taking: Reported on 2025), Disp: 18 g, Rfl: 5    methen-m.blue-s.phos-phsal-hyo (URIBEL) 118-10-40.8-36 mg Cap, Take 1 capsule by mouth 3 (three) times daily as needed (bladder pain)., Disp: 30 capsule, Rfl: 2    methocarbamoL (ROBAXIN) 750 MG Tab, TAKE 1 TABLET BY MOUTH NIGHTLY AS NEEDED., Disp: 30 tablet, Rfl: 3  [2]   Social History  Socioeconomic History    Marital status: Significant Other   Tobacco Use    Smoking status: Former     Current packs/day: 0.00     Types: Cigarettes, Vaping with nicotine     Quit date:      Years since quittin.5    Smokeless tobacco: Never   Substance and Sexual Activity    Alcohol use: Not Currently     Comment: 9 YEARS SOBER!    Drug use: Never    Sexual activity: Yes     Partners: Male     Birth control/protection: Other-see comments, None     Comment: Uterine ablation & tubal ligation     Social Drivers of Health     Financial Resource Strain: Medium Risk (2025)    Overall Financial Resource Strain (CARDIA)     Difficulty of Paying Living Expenses: Somewhat hard   Food Insecurity: Food Insecurity Present (2025)    Hunger Vital Sign     Worried About Running Out of Food in the Last Year: Sometimes true     Ran Out of  Food in the Last Year: Often true   Transportation Needs: No Transportation Needs (6/17/2025)    PRAPARE - Transportation     Lack of Transportation (Medical): No     Lack of Transportation (Non-Medical): No   Physical Activity: Sufficiently Active (6/17/2025)    Exercise Vital Sign     Days of Exercise per Week: 5 days     Minutes of Exercise per Session: 120 min   Stress: Stress Concern Present (6/17/2025)    [employer] [place] [employer] - Occupational Stress Questionnaire     Feeling of Stress : To some extent   Housing Stability: Low Risk  (6/17/2025)    Housing Stability Vital Sign     Unable to Pay for Housing in the Last Year: No     Homeless in the Last Year: No

## 2025-07-30 ENCOUNTER — TELEPHONE (OUTPATIENT)
Dept: OBSTETRICS AND GYNECOLOGY | Facility: CLINIC | Age: 35
End: 2025-07-30
Payer: MEDICAID

## 2025-07-30 ENCOUNTER — OUTSIDE PLACE OF SERVICE (OUTPATIENT)
Dept: OBSTETRICS AND GYNECOLOGY | Facility: CLINIC | Age: 35
End: 2025-07-30
Payer: MEDICAID

## 2025-07-30 ENCOUNTER — PATIENT MESSAGE (OUTPATIENT)
Dept: OBSTETRICS AND GYNECOLOGY | Facility: CLINIC | Age: 35
End: 2025-07-30

## 2025-07-30 ENCOUNTER — OUTSIDE PLACE OF SERVICE (OUTPATIENT)
Dept: OBSTETRICS AND GYNECOLOGY | Facility: CLINIC | Age: 35
End: 2025-07-30

## 2025-07-30 NOTE — TELEPHONE ENCOUNTER
Copied from CRM #[id number]. Topic: General Inquiry - Patient Advice  >> Jul 30, 2025  1:52 PM Giovanni wrote:  Type:  Needs Medical Advice    Who Called: Bee   Symptoms (please be specific): hysterectomy    How long has patient had these symptoms:    Pharmacy name and phone #:    Would the patient rather a call back or a response via [email or web address]? Call back   Best Call Back Number: [phone]  Additional Information: pt had procedure done today needing to speak with nurse

## 2025-07-30 NOTE — TELEPHONE ENCOUNTER
Patient informed we will ask Dr. Valladares about her patch tomorrow when she returns to clinic. Patient verbalized understanding.

## 2025-07-31 DIAGNOSIS — Z79.890 HORMONE REPLACEMENT THERAPY (HRT): Primary | ICD-10-CM

## 2025-07-31 RX ORDER — ESTRADIOL 0.07 MG/D
1 FILM, EXTENDED RELEASE TRANSDERMAL
Qty: 24 PATCH | Refills: 4 | Status: SHIPPED | OUTPATIENT
Start: 2025-07-31

## 2025-08-11 ENCOUNTER — TELEPHONE (OUTPATIENT)
Dept: OBSTETRICS AND GYNECOLOGY | Facility: CLINIC | Age: 35
End: 2025-08-11
Payer: MEDICAID

## 2025-08-14 ENCOUNTER — OFFICE VISIT (OUTPATIENT)
Dept: OBSTETRICS AND GYNECOLOGY | Facility: CLINIC | Age: 35
End: 2025-08-14
Payer: MEDICAID

## 2025-08-14 VITALS
BODY MASS INDEX: 22.68 KG/M2 | DIASTOLIC BLOOD PRESSURE: 71 MMHG | SYSTOLIC BLOOD PRESSURE: 101 MMHG | WEIGHT: 153.63 LBS | HEART RATE: 103 BPM

## 2025-08-14 DIAGNOSIS — Z90.710 S/P LAPAROSCOPIC HYSTERECTOMY: Primary | ICD-10-CM

## 2025-08-14 PROCEDURE — 99024 POSTOP FOLLOW-UP VISIT: CPT | Mod: ,,, | Performed by: OBSTETRICS & GYNECOLOGY

## 2025-08-14 PROCEDURE — 1159F MED LIST DOCD IN RCRD: CPT | Mod: CPTII,,, | Performed by: OBSTETRICS & GYNECOLOGY

## 2025-08-14 PROCEDURE — 3074F SYST BP LT 130 MM HG: CPT | Mod: CPTII,,, | Performed by: OBSTETRICS & GYNECOLOGY

## 2025-08-14 PROCEDURE — 3078F DIAST BP <80 MM HG: CPT | Mod: CPTII,,, | Performed by: OBSTETRICS & GYNECOLOGY

## 2025-08-14 PROCEDURE — 3044F HG A1C LEVEL LT 7.0%: CPT | Mod: CPTII,,, | Performed by: OBSTETRICS & GYNECOLOGY

## 2025-08-16 DIAGNOSIS — M06.09 RHEUMATOID ARTHRITIS OF MULTIPLE SITES WITH NEGATIVE RHEUMATOID FACTOR: ICD-10-CM

## 2025-08-18 RX ORDER — METHOCARBAMOL 750 MG/1
750 TABLET, FILM COATED ORAL NIGHTLY PRN
Qty: 30 TABLET | Refills: 3 | Status: SHIPPED | OUTPATIENT
Start: 2025-08-18

## 2025-08-21 DIAGNOSIS — F32.A DEPRESSION, UNSPECIFIED DEPRESSION TYPE: ICD-10-CM

## 2025-08-21 DIAGNOSIS — Z79.890 HORMONE REPLACEMENT THERAPY (HRT): ICD-10-CM

## 2025-08-22 RX ORDER — ESTRADIOL 0.07 MG/D
1 FILM, EXTENDED RELEASE TRANSDERMAL
Qty: 24 PATCH | Refills: 4 | Status: SHIPPED | OUTPATIENT
Start: 2025-08-25

## 2025-08-22 RX ORDER — BUPROPION HYDROCHLORIDE 75 MG/1
TABLET ORAL
Qty: 30 TABLET | Refills: 5 | Status: SHIPPED | OUTPATIENT
Start: 2025-08-22

## 2025-08-23 ENCOUNTER — PATIENT MESSAGE (OUTPATIENT)
Dept: OBSTETRICS AND GYNECOLOGY | Facility: CLINIC | Age: 35
End: 2025-08-23
Payer: MEDICAID

## 2025-08-28 DIAGNOSIS — F41.9 ANXIETY: ICD-10-CM

## 2025-08-28 DIAGNOSIS — E89.0 POSTOPERATIVE HYPOTHYROIDISM: ICD-10-CM

## 2025-08-28 DIAGNOSIS — E88.819 INSULIN RESISTANCE: ICD-10-CM

## 2025-09-02 DIAGNOSIS — E89.0 POSTOPERATIVE HYPOTHYROIDISM: ICD-10-CM

## 2025-09-02 RX ORDER — CITALOPRAM 40 MG/1
40 TABLET ORAL DAILY
Qty: 90 TABLET | Refills: 1 | Status: SHIPPED | OUTPATIENT
Start: 2025-09-02

## 2025-09-02 RX ORDER — LIOTHYRONINE SODIUM 5 UG/1
5 TABLET ORAL DAILY
Qty: 30 TABLET | Refills: 5 | Status: SHIPPED | OUTPATIENT
Start: 2025-09-02

## 2025-09-02 RX ORDER — METFORMIN HYDROCHLORIDE 500 MG/1
500 TABLET, EXTENDED RELEASE ORAL 2 TIMES DAILY
Qty: 180 TABLET | Refills: 1 | Status: SHIPPED | OUTPATIENT
Start: 2025-09-02

## 2025-09-02 RX ORDER — LEVOTHYROXINE SODIUM 88 UG/1
88 TABLET ORAL
Qty: 90 TABLET | Refills: 1 | Status: SHIPPED | OUTPATIENT
Start: 2025-09-02